# Patient Record
Sex: MALE | Race: WHITE | NOT HISPANIC OR LATINO | URBAN - METROPOLITAN AREA
[De-identification: names, ages, dates, MRNs, and addresses within clinical notes are randomized per-mention and may not be internally consistent; named-entity substitution may affect disease eponyms.]

---

## 2017-01-01 ENCOUNTER — HOME CARE/HOSPICE - HEALTHEAST (OUTPATIENT)
Dept: HOME HEALTH SERVICES | Facility: HOME HEALTH | Age: 82
End: 2017-01-01

## 2017-01-01 ENCOUNTER — COMMUNICATION - HEALTHEAST (OUTPATIENT)
Dept: FAMILY MEDICINE | Facility: CLINIC | Age: 82
End: 2017-01-01

## 2017-01-01 ENCOUNTER — COMMUNICATION - HEALTHEAST (OUTPATIENT)
Dept: SCHEDULING | Facility: CLINIC | Age: 82
End: 2017-01-01

## 2017-01-01 ENCOUNTER — RECORDS - HEALTHEAST (OUTPATIENT)
Dept: ADMINISTRATIVE | Facility: OTHER | Age: 82
End: 2017-01-01

## 2017-01-01 ENCOUNTER — COMMUNICATION - HEALTHEAST (OUTPATIENT)
Dept: HOME HEALTH SERVICES | Facility: HOME HEALTH | Age: 82
End: 2017-01-01

## 2017-01-01 ENCOUNTER — AMBULATORY - HEALTHEAST (OUTPATIENT)
Dept: FAMILY MEDICINE | Facility: CLINIC | Age: 82
End: 2017-01-01

## 2017-01-01 DIAGNOSIS — I26.99 PULMONARY EMBOLISM (H): ICD-10-CM

## 2017-01-01 DIAGNOSIS — S72.009A FEMORAL NECK FRACTURE (H): ICD-10-CM

## 2017-01-11 ENCOUNTER — COMMUNICATION - HEALTHEAST (OUTPATIENT)
Dept: FAMILY MEDICINE | Facility: CLINIC | Age: 82
End: 2017-01-11

## 2017-01-11 DIAGNOSIS — G89.4 CHRONIC PAIN SYNDROME: ICD-10-CM

## 2017-01-12 ENCOUNTER — COMMUNICATION - HEALTHEAST (OUTPATIENT)
Dept: FAMILY MEDICINE | Facility: CLINIC | Age: 82
End: 2017-01-12

## 2017-01-17 ENCOUNTER — OFFICE VISIT - HEALTHEAST (OUTPATIENT)
Dept: FAMILY MEDICINE | Facility: CLINIC | Age: 82
End: 2017-01-17

## 2017-01-17 DIAGNOSIS — G47.00 INSOMNIA, UNSPECIFIED: ICD-10-CM

## 2017-01-17 DIAGNOSIS — M16.11 OSTEOARTHRITIS OF ONE HIP, RIGHT: ICD-10-CM

## 2017-01-17 DIAGNOSIS — G89.29 OTHER CHRONIC PAIN: ICD-10-CM

## 2017-01-18 ENCOUNTER — COMMUNICATION - HEALTHEAST (OUTPATIENT)
Dept: FAMILY MEDICINE | Facility: CLINIC | Age: 82
End: 2017-01-18

## 2017-02-06 ENCOUNTER — COMMUNICATION - HEALTHEAST (OUTPATIENT)
Dept: FAMILY MEDICINE | Facility: CLINIC | Age: 82
End: 2017-02-06

## 2017-02-06 DIAGNOSIS — I10 ESSENTIAL HYPERTENSION, MALIGNANT: ICD-10-CM

## 2017-02-15 ENCOUNTER — COMMUNICATION - HEALTHEAST (OUTPATIENT)
Dept: FAMILY MEDICINE | Facility: CLINIC | Age: 82
End: 2017-02-15

## 2017-02-15 DIAGNOSIS — G89.29 OTHER CHRONIC PAIN: ICD-10-CM

## 2017-03-03 ENCOUNTER — COMMUNICATION - HEALTHEAST (OUTPATIENT)
Dept: FAMILY MEDICINE | Facility: CLINIC | Age: 82
End: 2017-03-03

## 2017-03-06 ENCOUNTER — OFFICE VISIT - HEALTHEAST (OUTPATIENT)
Dept: FAMILY MEDICINE | Facility: CLINIC | Age: 82
End: 2017-03-06

## 2017-03-06 DIAGNOSIS — M54.42 CHRONIC BILATERAL LOW BACK PAIN WITH LEFT-SIDED SCIATICA: ICD-10-CM

## 2017-03-06 DIAGNOSIS — L82.1 SEBORRHEIC KERATOSES: ICD-10-CM

## 2017-03-06 DIAGNOSIS — G89.29 CHRONIC BILATERAL LOW BACK PAIN WITH LEFT-SIDED SCIATICA: ICD-10-CM

## 2017-03-06 DIAGNOSIS — R32 URINARY INCONTINENCE, UNSPECIFIED TYPE: ICD-10-CM

## 2017-03-13 ENCOUNTER — COMMUNICATION - HEALTHEAST (OUTPATIENT)
Dept: FAMILY MEDICINE | Facility: CLINIC | Age: 82
End: 2017-03-13

## 2017-03-13 DIAGNOSIS — G89.29 OTHER CHRONIC PAIN: ICD-10-CM

## 2017-04-10 ENCOUNTER — RECORDS - HEALTHEAST (OUTPATIENT)
Dept: ADMINISTRATIVE | Facility: OTHER | Age: 82
End: 2017-04-10

## 2017-04-10 ENCOUNTER — COMMUNICATION - HEALTHEAST (OUTPATIENT)
Dept: FAMILY MEDICINE | Facility: CLINIC | Age: 82
End: 2017-04-10

## 2017-04-10 DIAGNOSIS — G89.29 OTHER CHRONIC PAIN: ICD-10-CM

## 2017-04-19 ENCOUNTER — OFFICE VISIT - HEALTHEAST (OUTPATIENT)
Dept: FAMILY MEDICINE | Facility: CLINIC | Age: 82
End: 2017-04-19

## 2017-04-19 DIAGNOSIS — S86.912D KNEE STRAIN, LEFT, SUBSEQUENT ENCOUNTER: ICD-10-CM

## 2017-04-19 DIAGNOSIS — I73.9 PERIPHERAL VASCULAR DISEASE, UNSPECIFIED (H): ICD-10-CM

## 2017-04-19 DIAGNOSIS — I10 ESSENTIAL HYPERTENSION WITH GOAL BLOOD PRESSURE LESS THAN 140/90: ICD-10-CM

## 2017-04-19 DIAGNOSIS — F17.200 TOBACCO USE DISORDER: ICD-10-CM

## 2017-04-25 ENCOUNTER — OFFICE VISIT - HEALTHEAST (OUTPATIENT)
Dept: INTERNAL MEDICINE | Facility: CLINIC | Age: 82
End: 2017-04-25

## 2017-04-25 ENCOUNTER — RECORDS - HEALTHEAST (OUTPATIENT)
Dept: GENERAL RADIOLOGY | Age: 82
End: 2017-04-25

## 2017-04-25 DIAGNOSIS — M79.605 PAIN IN LEFT LEG: ICD-10-CM

## 2017-04-25 DIAGNOSIS — M79.605 LEFT LEG PAIN: ICD-10-CM

## 2017-04-26 ENCOUNTER — COMMUNICATION - HEALTHEAST (OUTPATIENT)
Dept: FAMILY MEDICINE | Facility: CLINIC | Age: 82
End: 2017-04-26

## 2017-04-26 ENCOUNTER — COMMUNICATION - HEALTHEAST (OUTPATIENT)
Dept: INTERNAL MEDICINE | Facility: CLINIC | Age: 82
End: 2017-04-26

## 2017-04-26 DIAGNOSIS — R93.6 ABNORMAL X-RAY OF LOWER EXTREMITY: ICD-10-CM

## 2017-04-27 ENCOUNTER — RECORDS - HEALTHEAST (OUTPATIENT)
Dept: GENERAL RADIOLOGY | Age: 82
End: 2017-04-27

## 2017-04-27 DIAGNOSIS — R93.6 ABNORMAL FINDINGS ON DIAGNOSTIC IMAGING OF LIMBS: ICD-10-CM

## 2017-05-01 ENCOUNTER — COMMUNICATION - HEALTHEAST (OUTPATIENT)
Dept: INTERNAL MEDICINE | Facility: CLINIC | Age: 82
End: 2017-05-01

## 2017-05-02 ENCOUNTER — OFFICE VISIT - HEALTHEAST (OUTPATIENT)
Dept: INTERNAL MEDICINE | Facility: CLINIC | Age: 82
End: 2017-05-02

## 2017-05-02 DIAGNOSIS — M89.8X9 RADIOLUCENT LESION OF BONE: ICD-10-CM

## 2017-05-02 DIAGNOSIS — M79.605 LEFT LEG PAIN: ICD-10-CM

## 2017-05-03 ENCOUNTER — HOSPITAL ENCOUNTER (OUTPATIENT)
Dept: CT IMAGING | Facility: HOSPITAL | Age: 82
Discharge: HOME | End: 2017-05-03

## 2017-05-03 DIAGNOSIS — M89.8X9 RADIOLUCENT LESION OF BONE: ICD-10-CM

## 2017-05-04 ENCOUNTER — COMMUNICATION - HEALTHEAST (OUTPATIENT)
Dept: INTERNAL MEDICINE | Facility: CLINIC | Age: 82
End: 2017-05-04

## 2017-05-04 ENCOUNTER — COMMUNICATION - HEALTHEAST (OUTPATIENT)
Dept: PULMONOLOGY | Facility: OTHER | Age: 82
End: 2017-05-04

## 2017-05-04 DIAGNOSIS — I70.90 ATHEROSCLEROSIS: ICD-10-CM

## 2017-05-04 DIAGNOSIS — R91.8 LUNG MASS: ICD-10-CM

## 2017-05-05 LAB
ALBUMIN PERCENT: 56.3 % (ref 51–67)
ALBUMIN SERPL ELPH-MCNC: 3.4 G/DL (ref 3.2–4.7)
ALPHA 1 PERCENT: 4.8 % (ref 2–4)
ALPHA 2 PERCENT: 12.6 % (ref 5–13)
ALPHA1 GLOB SERPL ELPH-MCNC: 0.3 G/DL (ref 0.1–0.3)
ALPHA2 GLOB SERPL ELPH-MCNC: 0.8 G/DL (ref 0.4–0.9)
B-GLOBULIN SERPL ELPH-MCNC: 0.8 G/DL (ref 0.7–1.2)
BETA PERCENT: 12.4 % (ref 10–17)
GAMMA GLOB SERPL ELPH-MCNC: 0.8 G/DL (ref 0.6–1.4)
GAMMA GLOBULIN PERCENT: 13.9 % (ref 9–20)
PATH ICD:: ABNORMAL
PROT PATTERN SERPL ELPH-IMP: ABNORMAL
PROT SERPL-MCNC: 6.1 G/DL (ref 6–8)
REVIEWING PATHOLOGIST: ABNORMAL

## 2017-05-09 ENCOUNTER — HOSPITAL ENCOUNTER (OUTPATIENT)
Dept: PET IMAGING | Facility: HOSPITAL | Age: 82
Discharge: HOME | End: 2017-05-09

## 2017-05-09 DIAGNOSIS — R91.8 LUNG MASS: ICD-10-CM

## 2017-05-09 ASSESSMENT — MIFFLIN-ST. JEOR: SCORE: 1154.39

## 2017-05-11 ENCOUNTER — COMMUNICATION - HEALTHEAST (OUTPATIENT)
Dept: INTERNAL MEDICINE | Facility: CLINIC | Age: 82
End: 2017-05-11

## 2017-05-11 ENCOUNTER — COMMUNICATION - HEALTHEAST (OUTPATIENT)
Dept: FAMILY MEDICINE | Facility: CLINIC | Age: 82
End: 2017-05-11

## 2017-05-11 DIAGNOSIS — G89.29 OTHER CHRONIC PAIN: ICD-10-CM

## 2017-05-12 ENCOUNTER — OFFICE VISIT - HEALTHEAST (OUTPATIENT)
Dept: PULMONOLOGY | Facility: OTHER | Age: 82
End: 2017-05-12

## 2017-05-12 DIAGNOSIS — R05.3 CHRONIC COUGH: ICD-10-CM

## 2017-05-12 DIAGNOSIS — J85.2 ABSCESS OF UPPER LOBE OF LEFT LUNG WITHOUT PNEUMONIA (H): ICD-10-CM

## 2017-05-12 DIAGNOSIS — F17.200 TOBACCO USE DISORDER: ICD-10-CM

## 2017-05-12 ASSESSMENT — MIFFLIN-ST. JEOR: SCORE: 1140.33

## 2017-05-16 ENCOUNTER — OFFICE VISIT - HEALTHEAST (OUTPATIENT)
Dept: INTERNAL MEDICINE | Facility: CLINIC | Age: 82
End: 2017-05-16

## 2017-05-16 DIAGNOSIS — M53.3 COCCYX PAIN: ICD-10-CM

## 2017-05-16 ASSESSMENT — MIFFLIN-ST. JEOR: SCORE: 1158.92

## 2017-05-17 ENCOUNTER — OFFICE VISIT - HEALTHEAST (OUTPATIENT)
Dept: VASCULAR SURGERY | Facility: CLINIC | Age: 82
End: 2017-05-17

## 2017-05-17 DIAGNOSIS — I70.90 ATHEROSCLEROSIS: ICD-10-CM

## 2017-05-17 ASSESSMENT — MIFFLIN-ST. JEOR: SCORE: 1158.92

## 2017-05-23 ENCOUNTER — RECORDS - HEALTHEAST (OUTPATIENT)
Dept: ADMINISTRATIVE | Facility: OTHER | Age: 82
End: 2017-05-23

## 2017-06-01 ENCOUNTER — COMMUNICATION - HEALTHEAST (OUTPATIENT)
Dept: FAMILY MEDICINE | Facility: CLINIC | Age: 82
End: 2017-06-01

## 2017-06-06 ENCOUNTER — HOME CARE/HOSPICE - HEALTHEAST (OUTPATIENT)
Dept: HOME HEALTH SERVICES | Facility: HOME HEALTH | Age: 82
End: 2017-06-06

## 2017-06-08 ENCOUNTER — COMMUNICATION - HEALTHEAST (OUTPATIENT)
Dept: FAMILY MEDICINE | Facility: CLINIC | Age: 82
End: 2017-06-08

## 2017-06-23 ENCOUNTER — COMMUNICATION - HEALTHEAST (OUTPATIENT)
Dept: FAMILY MEDICINE | Facility: CLINIC | Age: 82
End: 2017-06-23

## 2017-06-23 DIAGNOSIS — G89.29 OTHER CHRONIC PAIN: ICD-10-CM

## 2017-07-13 ENCOUNTER — HOSPITAL ENCOUNTER (OUTPATIENT)
Dept: CT IMAGING | Facility: HOSPITAL | Age: 82
Discharge: HOME | End: 2017-07-13
Attending: INTERNAL MEDICINE

## 2017-07-13 ENCOUNTER — COMMUNICATION - HEALTHEAST (OUTPATIENT)
Dept: FAMILY MEDICINE | Facility: CLINIC | Age: 82
End: 2017-07-13

## 2017-07-13 DIAGNOSIS — J85.1 ABSCESS OF LEFT LUNG WITH PNEUMONIA, UNSPECIFIED PART OF LUNG (H): ICD-10-CM

## 2017-07-17 ENCOUNTER — AMBULATORY - HEALTHEAST (OUTPATIENT)
Dept: LAB | Facility: CLINIC | Age: 82
End: 2017-07-17

## 2017-07-17 ENCOUNTER — COMMUNICATION - HEALTHEAST (OUTPATIENT)
Dept: FAMILY MEDICINE | Facility: CLINIC | Age: 82
End: 2017-07-17

## 2017-07-17 ENCOUNTER — AMBULATORY - HEALTHEAST (OUTPATIENT)
Dept: FAMILY MEDICINE | Facility: CLINIC | Age: 82
End: 2017-07-17

## 2017-07-17 DIAGNOSIS — I25.10 CARDIOVASCULAR DISEASE: ICD-10-CM

## 2017-07-17 DIAGNOSIS — I26.99 PULMONARY EMBOLISM, BILATERAL (H): ICD-10-CM

## 2017-07-19 ENCOUNTER — AMBULATORY - HEALTHEAST (OUTPATIENT)
Dept: LAB | Facility: CLINIC | Age: 82
End: 2017-07-19

## 2017-07-19 DIAGNOSIS — I26.99 PULMONARY EMBOLISM, BILATERAL (H): ICD-10-CM

## 2017-07-21 ENCOUNTER — OFFICE VISIT - HEALTHEAST (OUTPATIENT)
Dept: FAMILY MEDICINE | Facility: CLINIC | Age: 82
End: 2017-07-21

## 2017-07-21 DIAGNOSIS — I26.99 PULMONARY EMBOLISM, BILATERAL (H): ICD-10-CM

## 2017-07-21 DIAGNOSIS — Z93.1 GASTROSTOMY TUBE IN PLACE (H): ICD-10-CM

## 2017-07-21 DIAGNOSIS — I10 ESSENTIAL HYPERTENSION WITH GOAL BLOOD PRESSURE LESS THAN 140/90: ICD-10-CM

## 2017-07-21 ASSESSMENT — MIFFLIN-ST. JEOR: SCORE: 1107.89

## 2017-07-24 ENCOUNTER — COMMUNICATION - HEALTHEAST (OUTPATIENT)
Dept: LAB | Facility: CLINIC | Age: 82
End: 2017-07-24

## 2017-07-24 ENCOUNTER — AMBULATORY - HEALTHEAST (OUTPATIENT)
Dept: LAB | Facility: CLINIC | Age: 82
End: 2017-07-24

## 2017-07-24 ENCOUNTER — COMMUNICATION - HEALTHEAST (OUTPATIENT)
Dept: FAMILY MEDICINE | Facility: CLINIC | Age: 82
End: 2017-07-24

## 2017-07-24 DIAGNOSIS — I10 ESSENTIAL HYPERTENSION: ICD-10-CM

## 2017-07-24 DIAGNOSIS — G89.29 OTHER CHRONIC PAIN: ICD-10-CM

## 2017-07-24 DIAGNOSIS — I26.99 PULMONARY EMBOLISM, BILATERAL (H): ICD-10-CM

## 2017-07-26 ENCOUNTER — AMBULATORY - HEALTHEAST (OUTPATIENT)
Dept: LAB | Facility: CLINIC | Age: 82
End: 2017-07-26

## 2017-07-26 DIAGNOSIS — I26.99 PULMONARY EMBOLISM, BILATERAL (H): ICD-10-CM

## 2017-07-27 ENCOUNTER — AMBULATORY - HEALTHEAST (OUTPATIENT)
Dept: FAMILY MEDICINE | Facility: CLINIC | Age: 82
End: 2017-07-27

## 2017-07-27 DIAGNOSIS — Z93.1 GASTROSTOMY TUBE IN PLACE (H): ICD-10-CM

## 2017-08-01 ENCOUNTER — AMBULATORY - HEALTHEAST (OUTPATIENT)
Dept: LAB | Facility: CLINIC | Age: 82
End: 2017-08-01

## 2017-08-01 DIAGNOSIS — I26.99 PULMONARY EMBOLISM, BILATERAL (H): ICD-10-CM

## 2017-08-02 ENCOUNTER — HOSPITAL ENCOUNTER (OUTPATIENT)
Dept: INTERVENTIONAL RADIOLOGY/VASCULAR | Facility: HOSPITAL | Age: 82
Discharge: HOME | End: 2017-08-02
Attending: FAMILY MEDICINE

## 2017-08-02 ENCOUNTER — AMBULATORY - HEALTHEAST (OUTPATIENT)
Dept: FAMILY MEDICINE | Facility: CLINIC | Age: 82
End: 2017-08-02

## 2017-08-02 DIAGNOSIS — Z93.1 GASTROSTOMY TUBE IN PLACE (H): ICD-10-CM

## 2017-08-09 ENCOUNTER — COMMUNICATION - HEALTHEAST (OUTPATIENT)
Dept: FAMILY MEDICINE | Facility: CLINIC | Age: 82
End: 2017-08-09

## 2017-08-09 DIAGNOSIS — G89.29 OTHER CHRONIC PAIN: ICD-10-CM

## 2017-08-10 ENCOUNTER — AMBULATORY - HEALTHEAST (OUTPATIENT)
Dept: LAB | Facility: CLINIC | Age: 82
End: 2017-08-10

## 2017-08-10 DIAGNOSIS — I26.99 PULMONARY EMBOLISM, BILATERAL (H): ICD-10-CM

## 2017-08-11 ENCOUNTER — AMBULATORY - HEALTHEAST (OUTPATIENT)
Dept: FAMILY MEDICINE | Facility: CLINIC | Age: 82
End: 2017-08-11

## 2017-08-23 ENCOUNTER — AMBULATORY - HEALTHEAST (OUTPATIENT)
Dept: FAMILY MEDICINE | Facility: CLINIC | Age: 82
End: 2017-08-23

## 2017-08-23 ENCOUNTER — COMMUNICATION - HEALTHEAST (OUTPATIENT)
Dept: FAMILY MEDICINE | Facility: CLINIC | Age: 82
End: 2017-08-23

## 2017-08-23 ENCOUNTER — AMBULATORY - HEALTHEAST (OUTPATIENT)
Dept: LAB | Facility: CLINIC | Age: 82
End: 2017-08-23

## 2017-08-23 DIAGNOSIS — I26.99 PULMONARY EMBOLISM, BILATERAL (H): ICD-10-CM

## 2017-08-23 DIAGNOSIS — G89.29 OTHER CHRONIC PAIN: ICD-10-CM

## 2017-08-24 ENCOUNTER — COMMUNICATION - HEALTHEAST (OUTPATIENT)
Dept: FAMILY MEDICINE | Facility: CLINIC | Age: 82
End: 2017-08-24

## 2017-08-31 ENCOUNTER — AMBULATORY - HEALTHEAST (OUTPATIENT)
Dept: LAB | Facility: CLINIC | Age: 82
End: 2017-08-31

## 2017-08-31 DIAGNOSIS — I26.99 PULMONARY EMBOLISM, BILATERAL (H): ICD-10-CM

## 2017-09-01 ENCOUNTER — AMBULATORY - HEALTHEAST (OUTPATIENT)
Dept: FAMILY MEDICINE | Facility: CLINIC | Age: 82
End: 2017-09-01

## 2017-09-06 ENCOUNTER — COMMUNICATION - HEALTHEAST (OUTPATIENT)
Dept: FAMILY MEDICINE | Facility: CLINIC | Age: 82
End: 2017-09-06

## 2017-09-11 ENCOUNTER — COMMUNICATION - HEALTHEAST (OUTPATIENT)
Dept: FAMILY MEDICINE | Facility: CLINIC | Age: 82
End: 2017-09-11

## 2017-09-11 DIAGNOSIS — G89.29 OTHER CHRONIC PAIN: ICD-10-CM

## 2017-09-13 ENCOUNTER — COMMUNICATION - HEALTHEAST (OUTPATIENT)
Dept: FAMILY MEDICINE | Facility: CLINIC | Age: 82
End: 2017-09-13

## 2017-09-13 DIAGNOSIS — G89.29 OTHER CHRONIC PAIN: ICD-10-CM

## 2017-09-13 DIAGNOSIS — I10 ESSENTIAL HYPERTENSION WITH GOAL BLOOD PRESSURE LESS THAN 140/90: ICD-10-CM

## 2017-09-27 ENCOUNTER — COMMUNICATION - HEALTHEAST (OUTPATIENT)
Dept: FAMILY MEDICINE | Facility: CLINIC | Age: 82
End: 2017-09-27

## 2017-09-28 ENCOUNTER — COMMUNICATION - HEALTHEAST (OUTPATIENT)
Dept: FAMILY MEDICINE | Facility: CLINIC | Age: 82
End: 2017-09-28

## 2017-09-28 DIAGNOSIS — I25.10 CARDIOVASCULAR DISEASE: ICD-10-CM

## 2017-09-28 DIAGNOSIS — I10 UNSPECIFIED ESSENTIAL HYPERTENSION: ICD-10-CM

## 2017-09-28 DIAGNOSIS — I10 ESSENTIAL HYPERTENSION, MALIGNANT: ICD-10-CM

## 2017-10-02 ENCOUNTER — COMMUNICATION - HEALTHEAST (OUTPATIENT)
Dept: FAMILY MEDICINE | Facility: CLINIC | Age: 82
End: 2017-10-02

## 2017-10-02 DIAGNOSIS — G89.29 OTHER CHRONIC PAIN: ICD-10-CM

## 2017-10-04 ENCOUNTER — COMMUNICATION - HEALTHEAST (OUTPATIENT)
Dept: FAMILY MEDICINE | Facility: CLINIC | Age: 82
End: 2017-10-04

## 2017-10-18 ENCOUNTER — COMMUNICATION - HEALTHEAST (OUTPATIENT)
Dept: FAMILY MEDICINE | Facility: CLINIC | Age: 82
End: 2017-10-18

## 2017-10-19 ASSESSMENT — MIFFLIN-ST. JEOR: SCORE: 1070.93

## 2017-10-20 ENCOUNTER — ANESTHESIA - HEALTHEAST (OUTPATIENT)
Dept: SURGERY | Facility: HOSPITAL | Age: 82
End: 2017-10-20

## 2017-10-20 ENCOUNTER — COMMUNICATION - HEALTHEAST (OUTPATIENT)
Dept: FAMILY MEDICINE | Facility: CLINIC | Age: 82
End: 2017-10-20

## 2017-10-20 ENCOUNTER — SURGERY - HEALTHEAST (OUTPATIENT)
Dept: SURGERY | Facility: HOSPITAL | Age: 82
End: 2017-10-20

## 2017-10-20 ASSESSMENT — MIFFLIN-ST. JEOR: SCORE: 1071.83

## 2017-10-25 ENCOUNTER — HOME CARE/HOSPICE - HEALTHEAST (OUTPATIENT)
Dept: HOME HEALTH SERVICES | Facility: HOME HEALTH | Age: 82
End: 2017-10-25

## 2017-10-26 ENCOUNTER — COMMUNICATION - HEALTHEAST (OUTPATIENT)
Dept: FAMILY MEDICINE | Facility: CLINIC | Age: 82
End: 2017-10-26

## 2017-10-28 ENCOUNTER — COMMUNICATION - HEALTHEAST (OUTPATIENT)
Dept: HOME HEALTH SERVICES | Facility: HOME HEALTH | Age: 82
End: 2017-10-28

## 2017-10-31 ENCOUNTER — HOME CARE/HOSPICE - HEALTHEAST (OUTPATIENT)
Dept: HOME HEALTH SERVICES | Facility: HOME HEALTH | Age: 82
End: 2017-10-31

## 2017-10-31 ENCOUNTER — COMMUNICATION - HEALTHEAST (OUTPATIENT)
Dept: HOME HEALTH SERVICES | Facility: HOME HEALTH | Age: 82
End: 2017-10-31

## 2017-11-01 ENCOUNTER — HOME CARE/HOSPICE - HEALTHEAST (OUTPATIENT)
Dept: HOME HEALTH SERVICES | Facility: HOME HEALTH | Age: 82
End: 2017-11-01

## 2017-11-02 ENCOUNTER — COMMUNICATION - HEALTHEAST (OUTPATIENT)
Dept: FAMILY MEDICINE | Facility: CLINIC | Age: 82
End: 2017-11-02

## 2017-11-02 ENCOUNTER — COMMUNICATION - HEALTHEAST (OUTPATIENT)
Dept: HOME HEALTH SERVICES | Facility: HOME HEALTH | Age: 82
End: 2017-11-02

## 2017-11-02 ENCOUNTER — HOME CARE/HOSPICE - HEALTHEAST (OUTPATIENT)
Dept: HOME HEALTH SERVICES | Facility: HOME HEALTH | Age: 82
End: 2017-11-02

## 2017-11-03 ENCOUNTER — HOME CARE/HOSPICE - HEALTHEAST (OUTPATIENT)
Dept: HOME HEALTH SERVICES | Facility: HOME HEALTH | Age: 82
End: 2017-11-03

## 2017-11-03 ENCOUNTER — COMMUNICATION - HEALTHEAST (OUTPATIENT)
Dept: HOME HEALTH SERVICES | Facility: HOME HEALTH | Age: 82
End: 2017-11-03

## 2017-11-03 ENCOUNTER — AMBULATORY - HEALTHEAST (OUTPATIENT)
Dept: FAMILY MEDICINE | Facility: CLINIC | Age: 82
End: 2017-11-03

## 2017-11-03 ENCOUNTER — COMMUNICATION - HEALTHEAST (OUTPATIENT)
Dept: SCHEDULING | Facility: CLINIC | Age: 82
End: 2017-11-03

## 2017-11-03 RX ORDER — FUROSEMIDE 20 MG
20 TABLET ORAL DAILY
Qty: 30 TABLET | Refills: 11 | Status: SHIPPED | OUTPATIENT
Start: 2017-11-03

## 2017-11-06 ENCOUNTER — HOME CARE/HOSPICE - HEALTHEAST (OUTPATIENT)
Dept: HOME HEALTH SERVICES | Facility: HOME HEALTH | Age: 82
End: 2017-11-06

## 2017-11-06 ENCOUNTER — COMMUNICATION - HEALTHEAST (OUTPATIENT)
Dept: FAMILY MEDICINE | Facility: CLINIC | Age: 82
End: 2017-11-06

## 2017-11-06 DIAGNOSIS — I26.99 PULMONARY EMBOLISM (H): ICD-10-CM

## 2017-11-07 ENCOUNTER — HOME CARE/HOSPICE - HEALTHEAST (OUTPATIENT)
Dept: HOME HEALTH SERVICES | Facility: HOME HEALTH | Age: 82
End: 2017-11-07

## 2017-11-08 ENCOUNTER — HOME CARE/HOSPICE - HEALTHEAST (OUTPATIENT)
Dept: HOME HEALTH SERVICES | Facility: HOME HEALTH | Age: 82
End: 2017-11-08

## 2017-11-09 ENCOUNTER — HOME CARE/HOSPICE - HEALTHEAST (OUTPATIENT)
Dept: HOME HEALTH SERVICES | Facility: HOME HEALTH | Age: 82
End: 2017-11-09

## 2017-11-10 ENCOUNTER — HOME CARE/HOSPICE - HEALTHEAST (OUTPATIENT)
Dept: HOME HEALTH SERVICES | Facility: HOME HEALTH | Age: 82
End: 2017-11-10

## 2017-11-11 ENCOUNTER — HOME CARE/HOSPICE - HEALTHEAST (OUTPATIENT)
Dept: HOME HEALTH SERVICES | Facility: HOME HEALTH | Age: 82
End: 2017-11-11

## 2017-11-13 ENCOUNTER — HOME CARE/HOSPICE - HEALTHEAST (OUTPATIENT)
Dept: HOME HEALTH SERVICES | Facility: HOME HEALTH | Age: 82
End: 2017-11-13

## 2017-11-13 ENCOUNTER — COMMUNICATION - HEALTHEAST (OUTPATIENT)
Dept: FAMILY MEDICINE | Facility: CLINIC | Age: 82
End: 2017-11-13

## 2017-11-13 DIAGNOSIS — I26.99 PULMONARY EMBOLISM (H): ICD-10-CM

## 2017-11-16 ENCOUNTER — HOME CARE/HOSPICE - HEALTHEAST (OUTPATIENT)
Dept: HOME HEALTH SERVICES | Facility: HOME HEALTH | Age: 82
End: 2017-11-16

## 2017-11-16 ENCOUNTER — COMMUNICATION - HEALTHEAST (OUTPATIENT)
Dept: FAMILY MEDICINE | Facility: CLINIC | Age: 82
End: 2017-11-16

## 2017-11-16 DIAGNOSIS — I26.99 PULMONARY EMBOLISM (H): ICD-10-CM

## 2017-11-20 ENCOUNTER — HOME CARE/HOSPICE - HEALTHEAST (OUTPATIENT)
Dept: HOME HEALTH SERVICES | Facility: HOME HEALTH | Age: 82
End: 2017-11-20

## 2017-11-20 ENCOUNTER — COMMUNICATION - HEALTHEAST (OUTPATIENT)
Dept: FAMILY MEDICINE | Facility: CLINIC | Age: 82
End: 2017-11-20

## 2017-11-20 DIAGNOSIS — I25.10 CARDIOVASCULAR DISEASE: ICD-10-CM

## 2017-11-20 DIAGNOSIS — I26.99 PULMONARY EMBOLISM (H): ICD-10-CM

## 2018-01-01 ENCOUNTER — COMMUNICATION - HEALTHEAST (OUTPATIENT)
Dept: FAMILY MEDICINE | Facility: CLINIC | Age: 83
End: 2018-01-01

## 2018-01-01 ENCOUNTER — COMMUNICATION - HEALTHEAST (OUTPATIENT)
Dept: SCHEDULING | Facility: CLINIC | Age: 83
End: 2018-01-01

## 2018-01-01 ENCOUNTER — AMBULATORY - HEALTHEAST (OUTPATIENT)
Dept: FAMILY MEDICINE | Facility: CLINIC | Age: 83
End: 2018-01-01

## 2018-01-01 DIAGNOSIS — I10 ESSENTIAL HYPERTENSION, MALIGNANT: ICD-10-CM

## 2018-01-01 DIAGNOSIS — R52 PAIN: ICD-10-CM

## 2018-01-01 DIAGNOSIS — I10 ESSENTIAL HYPERTENSION: ICD-10-CM

## 2018-01-01 DIAGNOSIS — I25.10 CARDIOVASCULAR DISEASE: ICD-10-CM

## 2018-01-01 RX ORDER — LOVASTATIN 20 MG
20 TABLET ORAL DAILY
Qty: 90 TABLET | Refills: 0 | Status: SHIPPED | OUTPATIENT
Start: 2018-01-01

## 2018-01-01 RX ORDER — TRAZODONE HYDROCHLORIDE 50 MG/1
50 TABLET, FILM COATED ORAL
Qty: 30 TABLET | Refills: 11 | Status: SHIPPED | OUTPATIENT
Start: 2018-01-01

## 2018-01-01 RX ORDER — OXYCODONE HYDROCHLORIDE 5 MG/1
5-10 TABLET ORAL 3 TIMES DAILY PRN
Qty: 100 TABLET | Refills: 0 | Status: SHIPPED | OUTPATIENT
Start: 2018-01-01

## 2018-01-01 RX ORDER — METOPROLOL TARTRATE 25 MG/1
12.5 TABLET, FILM COATED ORAL 2 TIMES DAILY
Qty: 30 TABLET | Refills: 11 | Status: SHIPPED | OUTPATIENT
Start: 2018-01-01

## 2018-01-01 RX ORDER — LISINOPRIL 20 MG/1
20 TABLET ORAL DAILY
Qty: 30 TABLET | Refills: 11 | Status: SHIPPED | OUTPATIENT
Start: 2018-01-01

## 2018-01-01 RX ORDER — AMLODIPINE BESYLATE 5 MG/1
5 TABLET ORAL DAILY
Qty: 90 TABLET | Refills: 0 | Status: SHIPPED | OUTPATIENT
Start: 2018-01-01

## 2018-11-23 ENCOUNTER — COMMUNICATION - HEALTHEAST (OUTPATIENT)
Dept: FAMILY MEDICINE | Facility: CLINIC | Age: 83
End: 2018-11-23

## 2021-05-24 ENCOUNTER — RECORDS - HEALTHEAST (OUTPATIENT)
Dept: ADMINISTRATIVE | Facility: CLINIC | Age: 86
End: 2021-05-24

## 2021-05-25 ENCOUNTER — RECORDS - HEALTHEAST (OUTPATIENT)
Dept: ADMINISTRATIVE | Facility: CLINIC | Age: 86
End: 2021-05-25

## 2021-05-26 ENCOUNTER — RECORDS - HEALTHEAST (OUTPATIENT)
Dept: ADMINISTRATIVE | Facility: CLINIC | Age: 86
End: 2021-05-26

## 2021-05-27 ENCOUNTER — RECORDS - HEALTHEAST (OUTPATIENT)
Dept: ADMINISTRATIVE | Facility: CLINIC | Age: 86
End: 2021-05-27

## 2021-05-28 ENCOUNTER — RECORDS - HEALTHEAST (OUTPATIENT)
Dept: ADMINISTRATIVE | Facility: CLINIC | Age: 86
End: 2021-05-28

## 2021-05-29 ENCOUNTER — RECORDS - HEALTHEAST (OUTPATIENT)
Dept: ADMINISTRATIVE | Facility: CLINIC | Age: 86
End: 2021-05-29

## 2021-05-30 ENCOUNTER — RECORDS - HEALTHEAST (OUTPATIENT)
Dept: ADMINISTRATIVE | Facility: CLINIC | Age: 86
End: 2021-05-30

## 2021-05-30 VITALS — WEIGHT: 117 LBS

## 2021-05-30 VITALS — WEIGHT: 123 LBS

## 2021-05-30 VITALS — WEIGHT: 118.9 LBS | HEIGHT: 64 IN | BODY MASS INDEX: 20.3 KG/M2

## 2021-05-30 VITALS — WEIGHT: 123 LBS | HEIGHT: 64 IN | BODY MASS INDEX: 21 KG/M2

## 2021-05-30 VITALS — WEIGHT: 121 LBS

## 2021-05-30 VITALS — BODY MASS INDEX: 21 KG/M2 | HEIGHT: 64 IN | WEIGHT: 123 LBS

## 2021-05-30 VITALS — WEIGHT: 125.75 LBS

## 2021-05-30 VITALS — BODY MASS INDEX: 20.83 KG/M2 | HEIGHT: 64 IN | WEIGHT: 122 LBS

## 2021-05-31 ENCOUNTER — RECORDS - HEALTHEAST (OUTPATIENT)
Dept: ADMINISTRATIVE | Facility: CLINIC | Age: 86
End: 2021-05-31

## 2021-05-31 VITALS — HEIGHT: 64 IN | BODY MASS INDEX: 17.72 KG/M2 | WEIGHT: 103.8 LBS

## 2021-05-31 VITALS — HEIGHT: 64 IN | BODY MASS INDEX: 19.08 KG/M2 | WEIGHT: 111.75 LBS

## 2021-06-01 ENCOUNTER — RECORDS - HEALTHEAST (OUTPATIENT)
Dept: ADMINISTRATIVE | Facility: CLINIC | Age: 86
End: 2021-06-01

## 2021-06-02 ENCOUNTER — RECORDS - HEALTHEAST (OUTPATIENT)
Dept: ADMINISTRATIVE | Facility: CLINIC | Age: 86
End: 2021-06-02

## 2021-06-08 NOTE — PROGRESS NOTES
Assessment: /    Plan:    1. Chronic Pain  Oxycodone HCl 10 mg Tab   2. Insomnia  traZODone (DESYREL) 50 MG tablet   3. Osteoarthritis of one hip, right         Change from methadone to oxycodone 10 mg 3 times daily, which is equivalent to what he had been taking previously.  Begin trazodone for sleep.  Recheck in one month, or sooner if any problems.  This was a 30 minute visit with >50% of the time spent in counseling and coordination of care regarding: Chronic pain, insomnia, right hip osteoarthritis.      Subjective:    HPI:  Danilo Javier is an 81-year-old male presenting for evaluation of chronic pain.  He wishes to change to the [facility] because he lives closer to this office.  He states that methadone has not been helping enough to relieve his pain.  He states that previously he took 6 Percocet 5-325 per day.    He states that melatonin has not been helping to sleep.    I reviewed 11/29/16 office visit with Dr. Tian regarding right hip osteoarthritis.  I also reviewed the report of the pelvis and hip x-rays indicating severe right hip osteoarthritis.  Patient states that he does not wish to have surgery during the winter, which would make it very difficult to get around.    Social Hx:  He is accompanied today by his wife, Liana.    Review of Systems:  He notes mild swelling of the feet during the past 1 week.  He has not had a cough, or chest pain.  He notes intermittent urinary incontinence which has been long-standing.      Current Outpatient Prescriptions   Medication Sig Dispense Refill     amLODIPine (NORVASC) 5 MG tablet Take 1 tablet (5 mg total) by mouth daily. 90 tablet 0     aspirin 325 MG tablet Take 325 mg by mouth daily.       CALCIUM CARBONATE (CALCIUM 500 ORAL) Take 1 tablet by mouth 2 (two) times a day.       furosemide (LASIX) 20 MG tablet TAKE 2 TABLET BY MOUTH DAILY 180 tablet 1     lisinopril (PRINIVIL,ZESTRIL) 10 MG tablet TAKE 1 TABLET BY MOUTH EVERY DAY 90 tablet 0      lovastatin (MEVACOR) 20 MG tablet TAKE 1 TABLET BY MOUTH DAILY 90 tablet 3     multivitamin (MULTIVITAMIN) per tablet Take 1 tablet by mouth daily.       Oxycodone HCl 10 mg Tab Take 10 mg by mouth 3 (three) times a day as needed. 90 each 0     traZODone (DESYREL) 50 MG tablet Take 1 tablet (50 mg total) by mouth bedtime. 30 tablet 11     No current facility-administered medications for this visit.          Objective:    Vitals:    01/17/17 1219 01/17/17 1225   BP: 150/70 148/70   Patient Site: Left Arm Left Arm   Patient Position: Sitting Sitting   Cuff Size: Adult Regular Adult Regular   Pulse: 93    Resp: 20    Temp: 98.2  F (36.8  C)    TempSrc: Oral    SpO2: 98%    Weight: 117 lb (53.1 kg)        Gen:  NAD, VSS.  He is seated in a wheelchair.  Lungs:  normal  Heart:  normal  Feet with 1+ edema, no ulcerations.        ADDITIONAL HISTORY SUMMARIZED (2): Reviewed 11/29/16 note by Dr. Tian.  DECISION TO OBTAIN EXTRA INFORMATION (1): None.   RADIOLOGY TESTS (1): 11/29/16 x-rays of the pelvis and hips.  LABS (1): None.  MEDICINE TESTS (1): None.  INDEPENDENT REVIEW (2 each): None.     Total Data Points: 3

## 2021-06-10 NOTE — PROGRESS NOTES
This winter popped left knee when operating .  Finished the snow blowing.  Taking oxycodone for back all the time.  Helps the knee.  Had used a walker for awhile, then worse, switched to wc.    In wc since prior to christmas.  Had seen other doctors and they won't touch him.  No testing.      Hypertension denies chest pain    Nicotine dependence.  Denies hemoptysis.  Over a ppd.    Says never told he had peripheral vascular disease  Wishes to switch primary provider and wishing ongoing pain control for back.    ROS: as noted above    OBJECTIVE:   Vitals:    04/19/17 1337   BP: 132/54   Pulse: 60   Resp: 20      Eyes: non icteric, noninflamed.  asymetric lids  Lungs: no resp distress  Heart: regular  Ankles: cindy edema   Tense firm cool skin.  No pulses felt.  Muscles: nontender  Mental status: euthymic  Neuro: nonfocal  Gait: wheelchair  Left knee and right with lig laxity anterior drawer.  Both flex over ninety degrees.  No effusion warmth or erythema.  Mild tender peripatellar    ASSESSMENT/PLAN:    1. Nicotine Dependence     2. Peripheral Vascular Disease     3. Essential hypertension with goal blood pressure less than 140/90     4. Knee strain, left, subsequent encounter       Knee strain/ offered but declined physical therapy.  Says is better and he'll try to strengthen  Advised on risk factor management of pvd  Educated on my general lack of chronic narcotic use for chronic pain.  Life style modification for lyric cessation.  Chronic issues stable/ same treatment.

## 2021-06-10 NOTE — PROGRESS NOTES
Assessment/Plan:        Diagnoses and all orders for this visit:    Abscess of upper lobe of left lung without pneumonia  -     clindamycin (CLEOCIN HCL) 300 MG capsule; Take 1 capsule (300 mg total) by mouth 3 (three) times a day.  Dispense: 126 capsule; Refill: 0  -     CT Chest Without Contrast; Future; Expected date: 6/30/17    Nicotine Dependence    Chronic cough     81-year-old man with findings on chest CT highly concerning for cancer given his history.  However characteristics of the lesion including uniformly circular shape, thin to medium relatively smooth thickness and absolute central necrosis seem more consistent with lung abscess.  We will treat with antibiotics with close follow-up.  Because of his penicillin allergy we will use clindamycin.  I counseled he and his wife very clearly about the risks for Clostridium difficile diarrhea and reasons for contacting us.    We also discussed decreasing tobacco use.  He is contemplating quitting.    Lung Infection  The mass on your lung looks like an infection, or abscess.  This is treated with antibiotics.  Because we want to be careful and make sure it isn't a tumor that looks like infection we will follow up closely with a CT.    Medication Plan  Clindamycin 1 tab 3 times daily for 6 weeks   -call us if you get significant diarrhea with abdominal pain or fever    Follow up  CT scan and clinic visit in 6 weeks.      Greater than 3 minutes spent discussing smoking cessation.    Subjective:    Patient ID: Danilo Javier is a 81 y.o. male.    HPI Comments: 9-year-old man referred here for lung mass.  He has cough with chronic sputum production.  This is been ongoing for many years.  This may have worsened with his tonsillitis.  Nothing seems to make it better.  He may be coughing up a little bit more sputum over the last few months.  He denies any hemoptysis.  His symptoms localized to his chest.  He had an episode of fever several months ago attributed to  tonsillitis.  This resolved.  He has no chest pain.  He does have leg pain for which she had some evaluation.  There is some concern about decreased mineral density as well as vessel disease.    He has been losing weight.  He has upper dentures and for lower is 1 of his teeth fell out recently.  However no known lung abscesses.    He has been smoking for many years.  He fears it will be very difficult to quit.  He is considering cutting down or quitting.    HPI collected from the patient, his wife and chart review.    Review of Systems  Review of systems is positive for dental problem mouth sores choking cold intolerance joint pain.  The remainder of a 15 system review of systems is negative.        Objective:    Physical Exam   Constitutional: He is oriented to person, place, and time. He appears well-developed and well-nourished. No distress.   HENT:   Head: Normocephalic.   Nose: Nose normal.   Mouth/Throat: Oropharynx is clear and moist. No oropharyngeal exudate.   Eyes: Pupils are equal, round, and reactive to light. Right eye exhibits no discharge. Left eye exhibits no discharge. No scleral icterus.   Neck: Normal range of motion. No JVD present. No tracheal deviation present. No thyromegaly present.   Cardiovascular: Normal rate and regular rhythm.  Exam reveals no gallop and no friction rub.    No murmur heard.  Pulmonary/Chest: Effort normal and breath sounds normal. No stridor. No respiratory distress. He has no wheezes. He has no rales.   Abdominal: Soft. Bowel sounds are normal. He exhibits no distension. There is no tenderness.   Musculoskeletal: He exhibits no edema or tenderness.   Lymphadenopathy:     He has no cervical adenopathy.   Neurological: He is alert and oriented to person, place, and time. No cranial nerve deficit.   Skin: Skin is warm and dry. No rash noted. He is not diaphoretic. No erythema. No pallor.   Psychiatric: He has a normal mood and affect. His behavior is normal. Judgment and  "thought content normal.           Current Outpatient Prescriptions on File Prior to Visit   Medication Sig Dispense Refill     amLODIPine (NORVASC) 5 MG tablet Take 1 tablet (5 mg total) by mouth daily. 90 tablet 0     aspirin 325 MG tablet Take 325 mg by mouth daily.       CALCIUM CARBONATE (CALCIUM 500 ORAL) Take 1 tablet by mouth 2 (two) times a day.       furosemide (LASIX) 20 MG tablet TAKE 2 TABLET BY MOUTH DAILY 180 tablet 1     lisinopril (PRINIVIL,ZESTRIL) 10 MG tablet TAKE 1 TABLET BY MOUTH EVERY DAY 90 tablet 0     lovastatin (MEVACOR) 20 MG tablet TAKE 1 TABLET BY MOUTH DAILY 90 tablet 3     multivitamin (MULTIVITAMIN) per tablet Take 1 tablet by mouth daily.       oxyCODONE (ROXICODONE) 10 mg immediate release tablet Take 1 tablet (10 mg total) by mouth 3 (three) times a day as needed. 90 tablet 0     [DISCONTINUED] traMADol (ULTRAM) 50 mg tablet Take 1 tablet (50 mg total) by mouth every 6 (six) hours as needed for pain. 60 tablet 0     No current facility-administered medications on file prior to visit.      /52  Pulse 79  Resp 18  Ht 5' 4\" (1.626 m)  Wt 118 lb 14.4 oz (53.9 kg)  SpO2 96% Comment: RA  BMI 20.41 kg/m2    Medical History  Active Ambulatory (Non-Hospital) Problems    Diagnosis     Abscess of upper lobe of left lung without pneumonia     Chronic bilateral low back pain with left-sided sciatica     Urinary incontinence, unspecified type     Controlled substance agreement signed     Acute, but ill-defined, cerebrovascular disease     Abnormal Weight Loss     Localized Osteoarthritis Of The Wrist     Lactase Deficiency Syndrome     Peripheral Vascular Disease     Insomnia     Nicotine Dependence     Osteoarthritis Of Multiple Sites     Disturbance Of Gait     Anterior Wall Chest Pain With Respiration     Hypercholesteremia     Chronic Pain     Hypertension     Arteriosclerotic Cardiovascular Disease (ASCVD)     Osteoarthritis Of The Hip     Joint Pain, Localized In The Shoulder "     No past medical history on file.     Surgical History  He  has a past surgical history that includes arthroplasty wrist jt; remv stomach,part,distal,gastroduod; larynx surg proc unlisted; arthroplasty wrist jt; evans w/o facetec foramot/dskc 1/2 vrt seg, cervical; evans w/o facetec foramot/dskc 1/2 vrt seg, cervical; remv prostate,perineal,radical; fusion radioulnar jnt/ulna resec; and remove eye w implant.       Social History  Reviewed, and he  reports that he has been smoking Pipe.  He has never used smokeless tobacco.    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     Allergies  Allergies   Allergen Reactions     Cephalosporins      Colchicine      Penicillins     Family History  Reviewed, and no family history of lung cancer or COPD.                            Data Review - imaging, labs, and ekgs listed below were reviewed by me.  Chest XRay and chest CT images and EKG tracings interpreted personally.     Past Labs  Hospital Outpatient Visit on 05/09/2017   Component Date Value     Glucose, POC 05/09/2017 122    Office Visit on 05/02/2017   Component Date Value     Sodium 05/02/2017 145      Potassium 05/02/2017 3.2*     Chloride 05/02/2017 107      CO2 05/02/2017 25      Anion Gap, Calculation 05/02/2017 13      Glucose 05/02/2017 134*     BUN 05/02/2017 23      Creatinine 05/02/2017 1.29      GFR MDRD Af Amer 05/02/2017 >60      GFR MDRD Non Af Amer 05/02/2017 54*     Bilirubin, Total 05/02/2017 0.3      Calcium 05/02/2017 8.9      Protein, Total 05/02/2017 6.1      Albumin 05/02/2017 2.9*     Alkaline Phosphatase 05/02/2017 68      AST 05/02/2017 11      ALT 05/02/2017 10      WBC 05/02/2017 10.8      RBC 05/02/2017 3.45*     Hemoglobin 05/02/2017 10.5*     Hematocrit 05/02/2017 31.0*     MCV 05/02/2017 90      MCH 05/02/2017 30.3      MCHC 05/02/2017 33.8      RDW 05/02/2017 12.4      Platelets 05/02/2017 341      MPV 05/02/2017 6.8*     Neutrophils % 05/02/2017 84*     Lymphocytes  % 05/02/2017 6*     Monocytes % 05/02/2017 8      Eosinophils % 05/02/2017 1      Basophils % 05/02/2017 1      Neutrophils Absolute 05/02/2017 9.1*     Lymphocytes Absolute 05/02/2017 0.7*     Monocytes Absolute 05/02/2017 0.9      Eosinophils Absolute 05/02/2017 0.1      Basophils Absolute 05/02/2017 0.1      Albumin % 05/02/2017 56.3      Albumin  05/02/2017 3.4      Alpha 1 % 05/02/2017 4.8*     Alpha 1 05/02/2017 0.3      Alpha 2 % 05/02/2017 12.6      Alpha 2 05/02/2017 0.8      % Beta 05/02/2017 12.4      Beta 05/02/2017 0.8      Gamma Globulin % 05/02/2017 13.9      Gamma Globulin 05/02/2017 0.8      ELP Comment 05/02/2017 Unremarkable protein electrophoresis.      Protein, Total 05/02/2017 6.1      Path ICD: 05/02/2017 M89.8X9      Interpreted By: 05/02/2017 Chay Hall MD        Past Imaging  Xr Femur Left 2 Vws    Result Date: 4/25/2017  XR FEMUR LEFT 2 VWS 4/25/2017 10:49 AM INDICATION: Mid femur tenderness pain. COMPARISON: None. FINDINGS: No fracture. 1 cm lucency projecting over the medial femoral condyle could represent summation artifact or less likely lytic lesion. Dedicated knee films or MRI could assess further. Arterial calcification. NOTE: ABNORMAL REPORT THE DICTATION ABOVE DESCRIBES AN ABNORMALITY FOR WHICH FOLLOW-UP IS NEEDED. This report was electronically interpreted by: Dr. Otis Chou MD ON 04/25/2017 at 13:24    Xr Knee Left Plus Sunrise Vw    Result Date: 4/27/2017  XR KNEE LEFT PLUS SUNRISE VW 4/27/2017 2:17 PM INDICATION: lucency seen on near medial epicondyle on femur xrays. COMPARISON: Femur film 4/25/2017 FINDINGS: There is a somewhat mottled pattern in both the distal femur and proximal tibia and fibula. A permeative pattern of bone can be seen in entities such as infection multiple myeloma and metastasis. This report was electronically interpreted by: Dr. Drake Butler MD ON 04/27/2017 at 18:38    Nm Pet Ct Skull To Mid Thigh    Result Date: 5/9/2017  PET FDG/CT  5/9/2017 2:45 PM INDICATION: Left pulmonary nodule TECHNIQUE: Serum glucose level 122 mg/dL. One hour post left antecubital intravenous administration of 8.4 mCi F-18 FDG, PET imaging was performed from the skull base to the mid thighs utilizing attenuation correction with concurrent axial CT and PET/CT image fusion. Dose reduction techniques were used. COMPARISON: CT from 05/03/2017 is reviewed. FINDINGS: HEAD AND NECK: Left enucleation with globe prosthesis. Cavum septum pellucidum. Moderate generalized cerebral and cerebellar volume loss, likely age-related. CHEST: 3.8 x 3.5 cm opacity in the lingula is markedly FDG avid (SUVmax 7.8) peripherally and completely photopenic and of low attenuation centrally. Normal sized left interlobar station 11L, left hilar station 10L, subcarinal station 7, and upper right paratracheal station 2R lymph nodes are mildly FDG avid. The station 7 node, for example, has an SUV max of 3.6. Calcified bilateral pleural plaques. Tiny left pleural effusion. Calcified atherosclerosis, including dense coronary disease. Decreased attenuation of blood pool relative to myocardium, suggesting anemia. ABDOMEN/PELVIS: No abnormal FDG activity. Nonobstructing left kidney stone. Dense calcified atherosclerosis. Right testicle is in the right inguinal canal. Prostatectomy and pelvic lymphadenectomy. Marrufo catheter. MUSCULOSKELETAL: Mild pectus excavatum. L3-L4 decompressive laminectomies. Posterior fusion L3-L4. Bilateral L4-L5 facet arthrodesis. Moderate degenerative change throughout the spine.     CONCLUSION: 3.8 cm left upper lobe pulmonary opacity could represent a necrotic tumor or an abscess. Left hilar and mediastinal lymphadenopathy could be metastatic or reactive. Consider endobronchial ultrasound-guided mediastinal lymph node biopsy for tissue characterization and staging.    Ct Chest Abdomen Pelvis Without Oral With Iv Contrast    Result Date: 5/3/2017  CT CHEST, ABDOMEN, AND PELVIS  5/3/2017 1:15 PM      INDICATION: bone lucency seen in distal femur and tibia. Evaluate for malignancy. Long time smoker and possible asbestos exposure TECHNIQUE: CT chest, abdomen, and pelvis. Dose reduction techniques were used. IV CONTRAST: Iohexol (Omni) 75mL COMPARISON: CT chest 7/10/2014. Chest radiographs 10/15/2015. MR lumbar spine 6/8/2010. Radiographs of the left femur 4/25/2017 and left knee 4/27/2017. FINDINGS: CHEST: 4.0 cm rounded subpleural mass of the posterior lateral lingula is predominantly of fluid density (e.g. image 40 of series 2). The lesion has a fairly uniformly thickened wall. No enlarged hilar or mediastinal lymph nodes. Moderate centrilobular emphysema. Bilateral calcified pleural plaques consistent with prior asbestos exposure. Dependent and streaky atelectasis of the left lower lobe. Mild streaky atelectasis or scarring in the right middle lobe. Small scattered scars in both lungs. Small left pleural effusion. Normal heart size. Multivessel coronary artery calcifications. Trace pericardial fluid.  ABDOMEN: Motion artifact degrades the images. A few subcentimeter hypodensities of the liver are too small for characterization. Distended gallbladder. No radiopaque gallstones seen. Mild nodularity of both adrenal glands. Atrophic pancreas. Central calcifications of both kidneys appear to be vascular rather than nonobstructing stones. Dense calcified atherosclerotic plaque of the aorta and branch vessels. 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 No aortic aneurysm. 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 Both superficial femoral arteries also appear to be occluded although are not fully imaged. Included deep femoral arteries are patent. PELVIS: Marrufo catheter decompresses the urinary bladder. Surgical changes of prostatectomy. MUSCULOSKELETAL: Bones are demineralized. 1.6 cm lucent lesion of the L3 vertebral body with thin sclerotic rim is unchanged compared to MR of the  lumbar spine 6/18/2010 and consistent with a benign process. No concerning lytic or blastic bone lesions detected in the torso. Surgical changes of posterior decompression and instrument effusion at L3-4 and instrumented fusion. Fusion of the facet joints at L4-5. Changes of degenerative disc disease and spondylosis throughout the spine. Mild convex right lumbar scoliosis centered at L3. Severe degenerative osteoarthritis of the right femoral acetabular joint with bone-on-bone articulation.     CONCLUSION: 1.  4.0 cm thick-walled fluid density lesion of the lingula is new from the most recent available chest examination, radiographs of 10/15/2015. Chronic abscess/necrotic lung parenchyma secondary to prior pneumonia is favored over necrotic lung cancer which is also in the differential diagnosis. Appearance is not typical of mesothelioma. This could be aspirated under CT guidance; however, PET/CT is recommended prior to performing any procedure. 2.  No evidence of metastatic disease in the chest, abdomen or pelvis. 3.  Emphysema. 4.  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 5.  Small left pleural effusion. 6.  Advanced atherosclerotic disease. 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 7.  Other findings as discussed above.

## 2021-06-10 NOTE — PROGRESS NOTES
Assessment: /    Plan:    1. Chronic bilateral low back pain with left-sided sciatica     2. Urinary incontinence, unspecified type     3. Seborrheic keratoses         Continue current medications.  Recheck in 2 months.  Lumbar support to be supplied by the company he contacted.  This was a 35 minute visit with >50% of the time spent in counseling and coordination of care regarding: Low back pain, urinary incontinence, seborrheic keratoses.      Subjective:    HPI:  Danilo Javier is an 81-year-old male returning for follow-up on low back pain.  MRI of the lumbar spine in 2010 demonstrated severe bilateral foraminal narrowing at L5-S1 and L4-L5.  He underwent laminectomies with screw placement in 2013.  He has been using a chair that has 4 wheels.  It is not a walker, and it is not a wheelchair.  He is taking oxycodone due to the chronic pain.  He would like to use a lumbar support, and he is aware of a company that can supply one.    He also uses depends due to urinary incontinence.  He had prostatectomy in 2007.    He notes moles on the back that he wishes to have examined.    Social Hx: He is accompanied by his wife.    Review of Systems: He denies fever or cough.      Current Outpatient Prescriptions   Medication Sig Dispense Refill     amLODIPine (NORVASC) 5 MG tablet Take 1 tablet (5 mg total) by mouth daily. 90 tablet 0     aspirin 325 MG tablet Take 325 mg by mouth daily.       CALCIUM CARBONATE (CALCIUM 500 ORAL) Take 1 tablet by mouth 2 (two) times a day.       furosemide (LASIX) 20 MG tablet TAKE 2 TABLET BY MOUTH DAILY 180 tablet 1     lisinopril (PRINIVIL,ZESTRIL) 10 MG tablet TAKE 1 TABLET BY MOUTH EVERY DAY 90 tablet 0     lovastatin (MEVACOR) 20 MG tablet TAKE 1 TABLET BY MOUTH DAILY 90 tablet 3     multivitamin (MULTIVITAMIN) per tablet Take 1 tablet by mouth daily.       Oxycodone HCl 10 mg Tab Take 10 mg by mouth 3 (three) times a day as needed. 90 each 0     No current facility-administered  medications for this visit.          Objective:    Vitals:    03/06/17 1043   BP: 128/60   Pulse: 70   Resp: 24   Temp: 97.8  F (36.6  C)   SpO2: 92%       Gen:  NAD, VSS  Lungs:  normal  Heart:  normal  Back with a few benign-appearing seborrheic keratoses.  Left knee with tenderness of the infrapatellar tendon.        ADDITIONAL HISTORY SUMMARIZED (2): None.  DECISION TO OBTAIN EXTRA INFORMATION (1): None.   RADIOLOGY TESTS (1): None.  LABS (1): None.  MEDICINE TESTS (1): None.  INDEPENDENT REVIEW (2 each): None.     Total Data Points: 0

## 2021-06-10 NOTE — PROGRESS NOTES
Pt doesn't walk and is in a wheelchair long distances. 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

## 2021-06-10 NOTE — PROGRESS NOTES
[facility] Clinic Note  Patient Name: Danilo Javier  Patient Age: 81 y.o.  YOB: 1935  MRN: [id number]  ?  Date of Visit: 5/2/2017  Reason for Office Visit:   Chief Complaint   Patient presents with     Follow-up     Follow up x-rays        HPI: Danilo Javier 81 y.o. male who presents to clinic for follow up x-rays and left leg pain. He was seen last week with insidious onset left leg pain. xrays were done which showed a mottled luceny pattern in the dital femur and proximal tibia concerning for either multiple myeloma, malignancy or infection. He does not have any systemic signs such as fevers/chills to suggest infection nor any open wounds in that area. The pain comes and goes,can be quite intense at times. He is taking oxy 10 mg TID, but can be ineffective at times.     Review of Systems: As noted in HPI     Current Scheduled Meds:  Outpatient Encounter Prescriptions as of 5/2/2017   Medication Sig Dispense Refill     amLODIPine (NORVASC) 5 MG tablet Take 1 tablet (5 mg total) by mouth daily. 90 tablet 0     aspirin 325 MG tablet Take 325 mg by mouth daily.       CALCIUM CARBONATE (CALCIUM 500 ORAL) Take 1 tablet by mouth 2 (two) times a day.       furosemide (LASIX) 20 MG tablet TAKE 2 TABLET BY MOUTH DAILY 180 tablet 1     lisinopril (PRINIVIL,ZESTRIL) 10 MG tablet TAKE 1 TABLET BY MOUTH EVERY DAY 90 tablet 0     lovastatin (MEVACOR) 20 MG tablet TAKE 1 TABLET BY MOUTH DAILY 90 tablet 3     multivitamin (MULTIVITAMIN) per tablet Take 1 tablet by mouth daily.       Oxycodone HCl 10 mg Tab Take 10 mg by mouth 3 (three) times a day as needed. 90 each 0     [DISCONTINUED] diclofenac sodium (VOLTAREN) 1 % Gel Apply sparingly to affected area up to four times a day 100 g 3     traMADol (ULTRAM) 50 mg tablet Take 1 tablet (50 mg total) by mouth every 6 (six) hours as needed for pain. 60 tablet 0     No facility-administered encounter medications on file as of 5/2/2017.        Objective /  Physical Examination:  /70  Pulse 80  Wt 121 lb (54.9 kg)  Wt Readings from Last 3 Encounters:   05/02/17 121 lb (54.9 kg)   04/25/17 123 lb (55.8 kg)   03/06/17 125 lb 12 oz (57 kg)     There is no height or weight on file to calculate BMI. (>25?)    General Appearance: Alert and oriented in no acute distress. Frail older gentleman sitting in wheelchair   Eyes: Conjunctivae clear and sclerae non-icteric  Cardiovascular: RRR   Extremities: ttp at distal femur. No swelling, erythema, open wounds  Integumentary: Warm and dry. Without suspicious looking lesions  Neuro: Alert and oriented, follows commands appropriately    Assessment / Plan / Medical Decision Making:      Encounter Diagnoses   Name Primary?     Radiolucent lesion of bone Yes     Left leg pain         1. Radiolucent lesion of bone  - Comprehensive Metabolic Panel  - HM1(CBC and Differential)  - CT Chest Abdomen Pelvis Without Oral With IV Contrast; Future  - HM1 (CBC with Diff)  - traMADol (ULTRAM) 50 mg tablet; Take 1 tablet (50 mg total) by mouth every 6 (six) hours as needed for pain.  Dispense: 60 tablet; Refill: 0  - Electrophoresis, Protein, Serum, Cascade    2. Left leg pain    Unclear if this represents mets from malignancy, MM, or something else. Will check some basic labs today including CBC, renal function and liver, as well a serum electrophoresis. Given long history of smoking and asbestos exposure will order a CT CAP with contrast to look for potential malignancy.     For pain he can try ultram Q6h for breakthrough pain.     Follow up results and return in 1 week to be seen.     Total time spent with patient was 15 minutes with >50% of time spent in face-to-face counseling regarding the above plan     Darryl Zurita MD  [facility]

## 2021-06-10 NOTE — PROGRESS NOTES
[facility] Clinic Note  Patient Name: Danilo Javier  Patient Age: 81 y.o.  YOB: 1935  MRN: [id number]  ?  Date of Visit: 4/25/2017  Reason for Office Visit:   Chief Complaint   Patient presents with     Leg Pain     left leg and hip pain ongoing for 6 months. Oxycodone is not helping. pain has come out of nowhere. Sometimes wakes him up.        HPI: Danilo Javier 81 y.o. male who presents to clinic for left leg/hip pain. Pain mostly around mid femur, sharp pains, no radiation. 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 Pain is hips going on for about 6 mo. Pain in leg started a couple weeks ago. Trouble sleeping sometimes at night due to pain. Has been taking oxycodone 10 mg TID, but does not help sometimes. No trauma.     Review of Systems: As noted in HPI     Current Scheduled Meds:  Outpatient Encounter Prescriptions as of 4/25/2017   Medication Sig Dispense Refill     amLODIPine (NORVASC) 5 MG tablet Take 1 tablet (5 mg total) by mouth daily. 90 tablet 0     aspirin 325 MG tablet Take 325 mg by mouth daily.       CALCIUM CARBONATE (CALCIUM 500 ORAL) Take 1 tablet by mouth 2 (two) times a day.       furosemide (LASIX) 20 MG tablet TAKE 2 TABLET BY MOUTH DAILY 180 tablet 1     lisinopril (PRINIVIL,ZESTRIL) 10 MG tablet TAKE 1 TABLET BY MOUTH EVERY DAY 90 tablet 0     lovastatin (MEVACOR) 20 MG tablet TAKE 1 TABLET BY MOUTH DAILY 90 tablet 3     multivitamin (MULTIVITAMIN) per tablet Take 1 tablet by mouth daily.       Oxycodone HCl 10 mg Tab Take 10 mg by mouth 3 (three) times a day as needed. 90 each 0     diclofenac sodium (VOLTAREN) 1 % Gel Apply sparingly to affected area up to four times a day 100 g 3     [DISCONTINUED] doxycycline (ADOXA) 100 MG tablet TK 1 T PO BID  0     No facility-administered encounter medications on file as of 4/25/2017.        Objective / Physical Examination:  /64  Pulse 78  Wt 123 lb (55.8 kg)  Wt Readings from Last 3 Encounters:   04/25/17 123 lb (55.8 kg)   03/06/17  125 lb 12 oz (57 kg)   01/17/17 117 lb (53.1 kg)     There is no height or weight on file to calculate BMI. (>25?)    General Appearance: Alert and oriented in no acute distress  Extremities: strength with hip flexion equal symmetric while sitting in wheelchair. Mild tenderness to palpation over mid thigh. No swelling. Sensation intact  Integumentary: Warm and dry. Without suspicious looking lesions  Neuro: Alert and oriented, follows commands appropriately.    Assessment / Plan / Medical Decision Making:      Encounter Diagnoses   Name Primary?     Left leg pain Yes        1. Left leg pain    Xray taken today, the radiologist commented on a small lucency near the medial epicondyle, which could represent artifact or less likely a lytic lesion.   I deferred increasing his oxycodone and will try an diclofenac gel QID prn. Will order a dedicated knee film to evaluate this further and follow up the results.     - XR Femur Left 2 VWS; Future  - diclofenac sodium (VOLTAREN) 1 % Gel; Apply sparingly to affected area up to four times a day  Dispense: 100 g; Refill: 3    F/u with PCP in 1-2 weeks     Total time spent with patient was 15 minutes with >50% of time spent in face-to-face counseling regarding the above plan     Darryl Zurita MD  [facility]

## 2021-06-10 NOTE — PROGRESS NOTES
HPI: I am consulted in this 81 y.o. male regarding peripheral vascular disease. The patient has complaints of left knee pain which is been ongoing for the past several weeks.  He is currently wheelchair-bound and does not walk much.  He is not very communicative nor is his wife at the time of interview.  He continues to smoke approximately 1 pack a day and recently underwent left knee imaging which demonstrated possible metastatic disease versus lytic lesion from a multiple myeloma.  He has had ABIs done in 2015 which were noted.  He denies any groin pain or any other lower extremity pain other than his left knee pain.    Allergies   Allergen Reactions     Cephalosporins      Colchicine      Penicillins          Current Outpatient Prescriptions:      amLODIPine (NORVASC) 5 MG tablet, Take 1 tablet (5 mg total) by mouth daily., Disp: 90 tablet, Rfl: 0     aspirin 325 MG tablet, Take 325 mg by mouth daily., Disp: , Rfl:      CALCIUM CARBONATE (CALCIUM 500 ORAL), Take 1 tablet by mouth 2 (two) times a day., Disp: , Rfl:      clindamycin (CLEOCIN HCL) 300 MG capsule, Take 1 capsule (300 mg total) by mouth 3 (three) times a day., Disp: 126 capsule, Rfl: 0     furosemide (LASIX) 20 MG tablet, TAKE 2 TABLET BY MOUTH DAILY, Disp: 180 tablet, Rfl: 1     lisinopril (PRINIVIL,ZESTRIL) 10 MG tablet, TAKE 1 TABLET BY MOUTH EVERY DAY, Disp: 90 tablet, Rfl: 0     lovastatin (MEVACOR) 20 MG tablet, TAKE 1 TABLET BY MOUTH DAILY, Disp: 90 tablet, Rfl: 3     multivitamin (MULTIVITAMIN) per tablet, Take 1 tablet by mouth daily., Disp: , Rfl:      oxyCODONE (ROXICODONE) 10 mg immediate release tablet, Take 1 tablet (10 mg total) by mouth 3 (three) times a day as needed., Disp: 90 tablet, Rfl: 0     traMADol (ULTRAM) 50 mg tablet, Take 1 tablet (50 mg total) by mouth every 6 (six) hours as needed for pain., Disp: 60 tablet, Rfl: 0    History reviewed. No pertinent past medical history.    Past Surgical History:   Procedure Laterality  Date     IN ARTHROPLASTY WRIST JT      Description: Wrist Arthroplasty;  Recorded: 04/07/2008;     IN ARTHROPLASTY WRIST JT      Description: Wrist Arthroplasty;  Proc Date: 04/30/2007;     IN FUSION RADIOULNAR JNT/ULNA RESEC      Description: Distal Radioulnar Arthrodesis And Segmental Ulnar Resection;  Proc Date: 11/15/2006;     IN ADAN W/O FACETEC FORAMOT/DSKC 1/2 VRT SEG, CERVICAL      Description: Laminectomy Lumbar;  Proc Date: 01/01/2000;     IN ADAN W/O FACETEC FORAMOT/DSKC 1/2 VRT SEG, CERVICAL      Description: Laminectomy Lumbar;  Proc Date: 01/01/1999;     IN LARYNX SURG PROC UNLISTED      Description: Laryngeal Surgery;  Recorded: 02/28/2012;     IN REMOVE EYE W IMPLANT      Description: Globe Enucleation With Implant;  Proc Date: 01/01/1946;  Comments: 1946, for trauma     IN REMV PROSTATE,PERINEAL,RADICAL      Description: Prostatectomy, Perineal Radical;  Proc Date: 01/15/2001;     IN REMV STOMACH,PART,DISTAL,GASTRODUOD      Description: Partial Gastrectomy;  Recorded: 02/28/2012;  Comments: for perforated gastric ulcer       Social History     Social History     Marital status:      Spouse name: N/A     Number of children: N/A     Years of education: N/A     Occupational History     Not on file.     Social History Main Topics     Smoking status: Current Every Day Smoker     Packs/day: 0.50     Years: 65.00     Types: Pipe     Smokeless tobacco: Never Used     Alcohol use No     Drug use: Not on file     Sexual activity: Not on file     Other Topics Concern     Not on file     Social History Narrative         Allergies:Cephalosporins; Colchicine; and Penicillins    History reviewed. No pertinent past medical history.    Past Surgical History:   Procedure Laterality Date     IN ARTHROPLASTY WRIST JT      Description: Wrist Arthroplasty;  Recorded: 04/07/2008;     IN ARTHROPLASTY WRIST JT      Description: Wrist Arthroplasty;  Proc Date: 04/30/2007;     IN FUSION RADIOULNAR JNT/ULNA RESEC       Description: Distal Radioulnar Arthrodesis And Segmental Ulnar Resection;  Proc Date: 11/15/2006;     [place] ADAN W/O FACETEC FORAMOT/DSKC 1/2 VRT SEG, CERVICAL      Description: Laminectomy Lumbar;  Proc Date: 01/01/2000;     [place] ADAN W/O FACETEC FORAMOT/DSKC 1/2 VRT SEG, CERVICAL      Description: Laminectomy Lumbar;  Proc Date: 01/01/1999;     [place] LARYNX SURG PROC UNLISTED      Description: Laryngeal Surgery;  Recorded: 02/28/2012;     [place] REMOVE EYE W IMPLANT      Description: Globe Enucleation With Implant;  Proc Date: 01/01/1946;  Comments: 1946, for trauma     [place] REMV PROSTATE,PERINEAL,RADICAL      Description: Prostatectomy, Perineal Radical;  Proc Date: 01/15/2001;     [place] REMV STOMACH,PART,DISTAL,GASTRODUOD      Description: Partial Gastrectomy;  Recorded: 02/28/2012;  Comments: for perforated gastric ulcer       CURRENT MEDS:  Current Outpatient Prescriptions   Medication Sig Dispense Refill     amLODIPine (NORVASC) 5 MG tablet Take 1 tablet (5 mg total) by mouth daily. 90 tablet 0     aspirin 325 MG tablet Take 325 mg by mouth daily.       CALCIUM CARBONATE (CALCIUM 500 ORAL) Take 1 tablet by mouth 2 (two) times a day.       clindamycin (CLEOCIN HCL) 300 MG capsule Take 1 capsule (300 mg total) by mouth 3 (three) times a day. 126 capsule 0     furosemide (LASIX) 20 MG tablet TAKE 2 TABLET BY MOUTH DAILY 180 tablet 1     lisinopril (PRINIVIL,ZESTRIL) 10 MG tablet TAKE 1 TABLET BY MOUTH EVERY DAY 90 tablet 0     lovastatin (MEVACOR) 20 MG tablet TAKE 1 TABLET BY MOUTH DAILY 90 tablet 3     multivitamin (MULTIVITAMIN) per tablet Take 1 tablet by mouth daily.       oxyCODONE (ROXICODONE) 10 mg immediate release tablet Take 1 tablet (10 mg total) by mouth 3 (three) times a day as needed. 90 tablet 0     traMADol (ULTRAM) 50 mg tablet Take 1 tablet (50 mg total) by mouth every 6 (six) hours as needed for pain. 60 tablet 0     No current facility-administered medications for this visit.        Family History   Problem  "Relation Age of Onset     Breast cancer Mother      Heart disease Brother      Breast cancer Sister         reports that he has been smoking Pipe.  He has a 32.50 pack-year smoking history. He has never used smokeless tobacco. He reports that he does not drink alcohol.                      Review of Systems -   The 12 system review is within normal limits except for as mentioned above.  General ROS: No complaints or constitutional symptoms  Ophthalmic ROS: No complaints of visual changes  Skin: As per HPI  Endocrine: No complaints or symptoms  Hematologic/Lymphatic: No symptoms or complaints  Psychiatric: No symptoms or complaints  Respiratory ROS: no cough, shortness of breath, or wheezing  Cardiovascular ROS: no chest pain or dyspnea on exertion  Gastrointestinal ROS: no GI symptoms or complaints  Genito-Urinary ROS: no dysuria, trouble voiding, or hematuria  Musculoskeletal ROS: As per HPI  Neurological ROS: no TIA or stroke symptoms      /62 (Patient Site: Left Arm, Patient Position: Sitting)  Pulse 72  Temp 97.8  F (36.6  C) (Oral)   Resp 18  Ht 5' 4\" (1.626 m) Comment: per pt report  Wt 123 lb (55.8 kg) Comment: per pt report  BMI 21.11 kg/m2  Body mass index is 21.11 kg/(m^2).    EXAM:  GENERAL: This is a 81 y.o. male sitting in a wheelchair, cachectic  SKIN: Grossly intact  HEAD AND NECK: Cranial nerves intact.   CARDIAC: RRR w/out murmur  CHEST/LUNG: Clear  ABDOMEN: Soft, non-tender, B/S present, no pulsatile mass  GROINS: Both femoral pulse palpable.  EXTREM: Bilateral lower extremity 2+ pitting edema no palpable pulses noted, feet are warm, left knee pain with palpation on the medial and lateral epicondyles  PSYCHIATRIC: Affect pleasant      IMAGES:   Xr Femur Left 2 Vws    Result Date: 4/25/2017  XR FEMUR LEFT 2 VWS 4/25/2017 10:49 AM INDICATION: Mid femur tenderness pain. COMPARISON: None. FINDINGS: No fracture. 1 cm lucency projecting over the medial femoral condyle could represent " summation artifact or less likely lytic lesion. Dedicated knee films or MRI could assess further. Arterial calcification. NOTE: ABNORMAL REPORT THE DICTATION ABOVE DESCRIBES AN ABNORMALITY FOR WHICH FOLLOW-UP IS NEEDED. This report was electronically interpreted by: Dr. Otis Chou MD ON 04/25/2017 at 13:24    Xr Knee Left Plus Sunrise Vw    Result Date: 4/27/2017  XR KNEE LEFT PLUS SUNRISE VW 4/27/2017 2:17 PM INDICATION: lucency seen on near medial epicondyle on femur xrays. COMPARISON: Femur film 4/25/2017 FINDINGS: There is a somewhat mottled pattern in both the distal femur and proximal tibia and fibula. A permeative pattern of bone can be seen in entities such as infection multiple myeloma and metastasis. This report was electronically interpreted by: Dr. Drake Butler MD ON 04/27/2017 at 18:38    Nm Pet Ct Skull To Mid Thigh    Result Date: 5/9/2017  PET FDG/CT 5/9/2017 2:45 PM INDICATION: Left pulmonary nodule TECHNIQUE: Serum glucose level 122 mg/dL. One hour post left antecubital intravenous administration of 8.4 mCi F-18 FDG, PET imaging was performed from the skull base to the mid thighs utilizing attenuation correction with concurrent axial CT and PET/CT image fusion. Dose reduction techniques were used. COMPARISON: CT from 05/03/2017 is reviewed. FINDINGS: HEAD AND NECK: Left enucleation with globe prosthesis. Cavum septum pellucidum. Moderate generalized cerebral and cerebellar volume loss, likely age-related. CHEST: 3.8 x 3.5 cm opacity in the lingula is markedly FDG avid (SUVmax 7.8) peripherally and completely photopenic and of low attenuation centrally. Normal sized left interlobar station 11L, left hilar station 10L, subcarinal station 7, and upper right paratracheal station 2R lymph nodes are mildly FDG avid. The station 7 node, for example, has an SUV max of 3.6. Calcified bilateral pleural plaques. Tiny left pleural effusion. Calcified atherosclerosis, including dense coronary disease.  Decreased attenuation of blood pool relative to myocardium, suggesting anemia. ABDOMEN/PELVIS: No abnormal FDG activity. Nonobstructing left kidney stone. Dense calcified atherosclerosis. Right testicle is in the right inguinal canal. Prostatectomy and pelvic lymphadenectomy. Marrufo catheter. MUSCULOSKELETAL: Mild pectus excavatum. L3-L4 decompressive laminectomies. Posterior fusion L3-L4. Bilateral L4-L5 facet arthrodesis. Moderate degenerative change throughout the spine.     CONCLUSION: 3.8 cm left upper lobe pulmonary opacity could represent a necrotic tumor or an abscess. Left hilar and mediastinal lymphadenopathy could be metastatic or reactive. Consider endobronchial ultrasound-guided mediastinal lymph node biopsy for tissue characterization and staging.    Ct Chest Abdomen Pelvis Without Oral With Iv Contrast    Result Date: 5/3/2017  CT CHEST, ABDOMEN, AND PELVIS 5/3/2017 1:15 PM      INDICATION: bone lucency seen in distal femur and tibia. Evaluate for malignancy. Long time smoker and possible asbestos exposure TECHNIQUE: CT chest, abdomen, and pelvis. Dose reduction techniques were used. IV CONTRAST: Iohexol (Omni) 75mL COMPARISON: CT chest 7/10/2014. Chest radiographs 10/15/2015. MR lumbar spine 6/8/2010. Radiographs of the left femur 4/25/2017 and left knee 4/27/2017. FINDINGS: CHEST: 4.0 cm rounded subpleural mass of the posterior lateral lingula is predominantly of fluid density (e.g. image 40 of series 2). The lesion has a fairly uniformly thickened wall. No enlarged hilar or mediastinal lymph nodes. Moderate centrilobular emphysema. Bilateral calcified pleural plaques consistent with prior asbestos exposure. Dependent and streaky atelectasis of the left lower lobe. Mild streaky atelectasis or scarring in the right middle lobe. Small scattered scars in both lungs. Small left pleural effusion. Normal heart size. Multivessel coronary artery calcifications. Trace pericardial fluid.  ABDOMEN: Motion  artifact degrades the images. A few subcentimeter hypodensities of the liver are too small for characterization. Distended gallbladder. No radiopaque gallstones seen. Mild nodularity of both adrenal glands. Atrophic pancreas. Central calcifications of both kidneys appear to be vascular rather than nonobstructing stones. Dense calcified atherosclerotic plaque of the aorta and branch vessels. 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 No aortic aneurysm. 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 Both superficial femoral arteries also appear to be occluded although are not fully imaged. Included deep femoral arteries are patent. PELVIS: Marrufo catheter decompresses the urinary bladder. Surgical changes of prostatectomy. MUSCULOSKELETAL: Bones are demineralized. 1.6 cm lucent lesion of the L3 vertebral body with thin sclerotic rim is unchanged compared to MR of the lumbar spine 6/18/2010 and consistent with a benign process. No concerning lytic or blastic bone lesions detected in the torso. Surgical changes of posterior decompression and instrument effusion at L3-4 and instrumented fusion. Fusion of the facet joints at L4-5. Changes of degenerative disc disease and spondylosis throughout the spine. Mild convex right lumbar scoliosis centered at L3. Severe degenerative osteoarthritis of the right femoral acetabular joint with bone-on-bone articulation.     CONCLUSION: 1.  4.0 cm thick-walled fluid density lesion of the lingula is new from the most recent available chest examination, radiographs of 10/15/2015. Chronic abscess/necrotic lung parenchyma secondary to prior pneumonia is favored over necrotic lung cancer which is also in the differential diagnosis. Appearance is not typical of mesothelioma. This could be aspirated under CT guidance; however, PET/CT is recommended prior to performing any procedure. 2.  No evidence of metastatic disease in the chest, abdomen or pelvis. 3.  Emphysema. 4.  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 5.  Small left pleural effusion. 6.  Advanced atherosclerotic disease. 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 7.  Other findings as discussed above.      Assessment/Plan: Danilo Javier has left knee pain which is of unclear etiology.  This may be likely to metastatic disease if in fact the findings on the CT scan of the chest and the PET scan are consistent with malignancy.  At this point I do not think further workup is warranted for his vasculature.  He is not a great candidate for intervention at this time especially given the fact that he is wheelchair-bound and not very mobile.  I have recommended that he stop smoking which she states is not possible.  I do think that he requires further workup for his chest mass/abscess.  I have recommended this to his wife who did not seem very interested at the time of exam.  At this point he may follow up with me on a as needed basis.  We will give him to go gauze for his edema which may facilitate comfort while sitting in his wheelchair.  With regards to his aneurysms we may continue follow-up with surveillance ultrasound in 6 months to ensure that these are not enlarging.      [place] ,DO FACS  [facility]

## 2021-06-12 NOTE — PROGRESS NOTES
Patient arrived to post procedure for gastrostomy (GJ) tube removal. Patient is here with his wife Liana. Order is checked and per order the tube was place mid June, per wife the end of May the tube was inserted. Explained to the patient regarding gj tube removal. Patient and wife voices no needs or concerns. Prep patient as protocal. Removed 5 ml to deflated the balloon. Patient tolerated removal. Applied gauze to site and Microfoam tape. Patient wife is given paper tape and gauze. Discharge paper work given. Patient stayed in the unit for 20 minutes. Denies any pain or needs.

## 2021-06-12 NOTE — PROGRESS NOTES
Assessment: /    Plan:    1. Pulmonary embolism, bilateral  INR   2. Essential hypertension with goal blood pressure less than 140/90  lisinopril (PRINIVIL,ZESTRIL) 40 MG tablet   3. Gastrostomy tube in place  IR Tube Removal       No warfarin today.  He will take 2.5 mg on Saturday, and none on Sunday.  Recheck INR Monday, 7/24/17.    I reconciled all of his medications.      Subjective:    HPI:  Danilo Javier is an 81-year-old male presenting for hospital follow-up.  He was at [facility] due to pulmonary embolism, 7/13/17 - 7/16/17.  He had multiple small pulmonary emboli.  Eliquis was not covered by insurance, therefore he was started on Lovenox and warfarin.  He has been taking 5 mg daily, except none for the past 2 days following INR of 4.6.  CT of the chest demonstrated no large or central emboli.  His pulmonary abscess has diminished significantly.  Echocardiogram on 7/15/17 demonstrated ejection fraction of 50-55%, which is lower limits of normal.  He has diastolic dysfunction.  Renal function panel demonstrated albumin low at 2.8, otherwise normal.  I reviewed the discharge summary by Dr. Richards.    He has a gastrostomy tube was placed during hospitalization in May for unresponsiveness.  He has not used this for feeding for the past 6 weeks, and wishes to have this removed.    Social Hx: He is accompanied by his wife, Liana.    Review of Systems: No chest pain or wheezing.  No epistaxis, melena or hematochezia.      Current Outpatient Prescriptions   Medication Sig Dispense Refill     amLODIPine (NORVASC) 5 MG tablet Take 5 mg by mouth daily.       aspirin 325 MG EC tablet Take 325 mg by mouth daily.       CALCIUM CARBONATE (CALCIUM 500 ORAL) Take 1 tablet by mouth 2 (two) times a day.       enoxaparin (LOVENOX) 60 mg/0.6 mL syringe Inject 0.5 mL (50 mg total) under the skin every 12 (twelve) hours. 10 Syringe 0     furosemide (LASIX) 20 MG tablet Take 2 tablets (40 mg total) by mouth daily. 60 tablet 0      Lactobacillus rhamnosus GG (CULTURELLE) 15 billion cell CpSP Take 1 capsule by mouth 3 (three) times a day with meals.  0     lisinopril (PRINIVIL,ZESTRIL) 40 MG tablet Take 1 tablet (40 mg total) by mouth daily. 30 tablet 11     lovastatin (MEVACOR) 20 MG tablet Take 1 tablet (20 mg total) by mouth daily. 30 tablet 0     metoprolol tartrate (LOPRESSOR) 25 MG tablet Take 0.5 tablets (12.5 mg total) by mouth 2 (two) times a day. 30 tablet 11     multivitamin (MULTIVITAMIN) per tablet Take 1 tablet by mouth daily.       POTASSIUM ORAL Take 1 tablet by mouth daily. OTC potassium       senna-docusate (PERICOLACE) 8.6-50 mg tablet Take 1 tablet by mouth 2 (two) times a day. Do not administer if loose stools.  0     traMADol (ULTRAM) 50 mg tablet Take 1 tablet (50 mg total) by mouth every 6 (six) hours as needed for pain. 30 tablet 0     warfarin (COUMADIN) 5 MG tablet Take one tab (5mg) tonight and check INR tomorrow and further dosing per primary. 30 tablet 0     oxyCODONE (ROXICODONE) 10 mg immediate release tablet Take 0.5 tablets (5 mg total) by mouth 3 (three) times a day as needed for pain. 45 tablet 0     No current facility-administered medications for this visit.          Objective:    Vitals:    07/21/17 1601   BP: 118/60   Pulse: (!) 56   Resp: 19   Temp: 98.2  F (36.8  C)   SpO2: 94%       Gen:  NAD, VSS  Lungs:  normal  Heart:  normal  Abdomen:  No HSM, mass or tenderness    INR is 3.8    ADDITIONAL HISTORY SUMMARIZED (2): Reviewed discharge summary.  DECISION TO OBTAIN EXTRA INFORMATION (1): None.   RADIOLOGY TESTS (1): Reviewed CT of the chest.  LABS (1): Reviewed INR and other labs.  MEDICINE TESTS (1): Reviewed echocardiogram.  INDEPENDENT REVIEW (2 each): None.     Total Data Points: 5

## 2021-06-13 NOTE — ANESTHESIA POSTPROCEDURE EVALUATION
Patient: Danilo Javier  LEFT HIP HEMIARTHROPLASTY  Anesthesia type: spinal    Patient location: PACU  Last vitals:   Vitals:    10/20/17 1145   BP: 116/58   Pulse: (!) 54   Resp: 14   Temp: 36.2  C (97.1  F)   SpO2: 100%   Case done under SAB  Post vital signs: stable  Level of consciousness: appears baseline, fully awake  Post-anesthesia pain: pain controlled  Post-anesthesia nausea and vomiting: no  Pulmonary: room air  Cardiovascular: stable and blood pressure at baseline  Hydration: adequate  Anesthetic events: no    QCDR Measures:  ASA# 11 - Zoya-op Cardiac Arrest: ASA11B - Patient did NOT experience unanticipated cardiac arrest  ASA# 12 - Zoya-op Mortality Rate: ASA12B - Patient did NOT die  ASA# 13 - PACU Re-Intubation Rate: ASA13B - Patient did NOT require a new airway mgmt  ASA# 10 - Composite Anes Safety: ASA10A - No serious adverse event    Additional Notes:

## 2021-06-13 NOTE — ANESTHESIA CARE TRANSFER NOTE
Last vitals:   Vitals:    10/20/17 1145   BP: 116/58   Pulse: (!) 54   Resp: 14   Temp: 36.2  C (97.1  F)   SpO2: 100%     Patient's level of consciousness is drowsy  Spontaneous respirations: yes  Maintains airway independently: yes  Dentition unchanged: yes  Oropharynx: oropharynx clear of all foreign objects    QCDR Measures:  ASA# 20 - Surgical Safety Checklist: WHO surgical safety checklist completed prior to induction  PQRS# 430 - Adult PONV Prevention: 4558F - Pt received => 2 anti-emetic agents (different classes) preop & intraop  ASA# 8 - Peds PONV Prevention: NA - Not pediatric patient, not GA or 2 or more risk factors NOT present  PQRS# 424 - Zoya-op Temp Management: 4559F - At least one body temp DOCUMENTED => 35.5C or 95.9F within required timeframe  PQRS# 426 - PACU Transfer Protocol: - Transfer of care checklist used  ASA# 14 - Acute Post-op Pain: ASA14B - Patient did NOT experience pain >= 7 out of 10

## 2021-06-13 NOTE — ANESTHESIA PROCEDURE NOTES
Spinal Block    Start time: 10/20/2017 10:00 AM  End time: 10/20/2017 10:15 AM  Reason for block: primary anesthetic    Staffing:  Performing  Anesthesiologist: FIORDALIZA SHEETS    Preanesthetic Checklist  Completed: patient identified, risks, benefits, and alternatives discussed, timeout performed, consent obtained, airway assessed, oxygen available, suction available, emergency drugs available and hand hygiene performed  Spinal Block  Patient position: right lateral decubitus  Prep: Betadine and site prepped and draped  Patient monitoring: heart rate, cardiac monitor and blood pressure  Approach: midline  Location: L3-4  Injection technique: single-shot  Needle type: Quincke   Needle gauge: 22 G    Assessment  Sensory level: T8

## 2021-06-13 NOTE — ANESTHESIA PREPROCEDURE EVALUATION
"Anesthesia Evaluation      Patient summary reviewed   No history of anesthetic complications     Airway   Mallampati: II  Neck ROM: full   Pulmonary - normal exam   (+) a smoker  (-) pneumonia, shortness of breath                         Cardiovascular - normal exam  (+) hypertension, past MI, CAD, dysrhythmias, , hypercholesterolemia, PVD     Neuro/Psych    (+) CVA , chronic pain    Endo/Other    (+) arthritis,      GI/Hepatic/Renal    (+)   chronic renal disease,   (-) GERD     Other findings: Pt very inactive.  Not sure if he is a reliable historian.  States he smokes, denies GERD, denies residual from CVA, is in a wheelchair as he has \"trouble getting around\".  MI in 2002. Bilateral PEs 7/2017, placed on coumadin.  H/o lung abscess in past.  Dysphagia, severe malnutrition, weakness, chronic LBP, past h/o delirium, past h/o a-fib.  Mild AMBER, likely owing to low volume status.  Severe hypokalemia on admission, 2.4, being replaced. Suspect he came in dehydrated, labs today show Hg 10.1, K 4.5, Cr 0.92.      Echo 7/15/17:  Bradycardia and irregular heartbeat  Left ventricular systolic function appears lower limits of normal estimated at 50-55%  Left ventricular diastolic dysfunction consistent with impaired relaxation  Right ventricular systolic function appears lower limits of normal with mildly reduced TAPSE  Normal central venous pressure suggested  Aortic sclerosis without stenosis  Mild enlargement of the proximal aortic root  When compared to the previous study dated 5/22/2017, heart rate is slower. The degree of tricuspid insufficiency is less on the current examination Edentulous up, lower partial which is out, some teeth are decayed, denies any loose ones.      Dental                         Anesthesia Plan  Planned anesthetic: spinal    ASA 3     Anesthetic plan and risks discussed with: patient    Post-op plan: routine recovery          "

## 2021-06-15 PROBLEM — R41.0 DELIRIUM: Status: ACTIVE | Noted: 2017-05-25

## 2021-06-15 PROBLEM — R13.10 DYSPHAGIA: Status: ACTIVE | Noted: 2017-05-31

## 2021-06-15 PROBLEM — R32 URINARY INCONTINENCE, UNSPECIFIED TYPE: Status: ACTIVE | Noted: 2017-04-15

## 2021-06-15 PROBLEM — G89.29 CHRONIC BILATERAL LOW BACK PAIN WITH LEFT-SIDED SCIATICA: Chronic | Status: ACTIVE | Noted: 2017-04-15

## 2021-06-15 PROBLEM — Z51.5 PALLIATIVE CARE ENCOUNTER: Status: ACTIVE | Noted: 2017-05-25

## 2021-06-15 PROBLEM — R06.02 SHORTNESS OF BREATH: Status: ACTIVE | Noted: 2017-05-25

## 2021-06-15 PROBLEM — M54.42 CHRONIC BILATERAL LOW BACK PAIN WITH LEFT-SIDED SCIATICA: Chronic | Status: ACTIVE | Noted: 2017-04-15

## 2021-06-15 PROBLEM — J85.1: Status: ACTIVE | Noted: 2017-05-21

## 2021-06-16 PROBLEM — E43 SEVERE MALNUTRITION (H): Status: ACTIVE | Noted: 2017-06-01

## 2021-06-16 PROBLEM — F11.20 OPIOID DEPENDENCE, CONTINUOUS (H): Status: ACTIVE | Noted: 2017-10-25

## 2021-06-16 PROBLEM — G89.18 POSTOPERATIVE PAIN: Status: ACTIVE | Noted: 2017-10-25

## 2021-06-16 PROBLEM — R41.89 UNRESPONSIVE: Status: ACTIVE | Noted: 2017-06-05

## 2021-06-16 PROBLEM — S72.009A FEMORAL NECK FRACTURE (H): Status: ACTIVE | Noted: 2017-10-19

## 2021-06-16 PROBLEM — R53.1 WEAKNESS GENERALIZED: Status: ACTIVE | Noted: 2017-07-14

## 2021-06-16 PROBLEM — G89.4 CHRONIC PAIN SYNDROME: Status: ACTIVE | Noted: 2017-10-25

## 2021-06-16 PROBLEM — D64.9 ANEMIA: Status: ACTIVE | Noted: 2017-06-01

## 2021-06-16 PROBLEM — I26.99 PULMONARY EMBOLISM (H): Status: ACTIVE | Noted: 2017-07-13

## 2021-06-16 PROBLEM — R60.1 ANASARCA: Status: ACTIVE | Noted: 2017-06-01

## 2021-06-16 PROBLEM — Z86.73 H/O: STROKE: Status: ACTIVE | Noted: 2017-07-14

## 2021-06-16 PROBLEM — J85.2: Status: ACTIVE | Noted: 2017-06-07

## 2021-06-18 ENCOUNTER — RECORDS - HEALTHEAST (OUTPATIENT)
Dept: ADMINISTRATIVE | Facility: CLINIC | Age: 86
End: 2021-06-18

## 2021-06-25 NOTE — PROGRESS NOTES
Progress Notes by Sunil Vizcarra at 5/16/2017 11:20 AM     Author: Sunil Vizcarra Service: -- Author Type: Nurse Practitioner    Filed: 5/16/2017  1:30 PM Encounter Date: 5/16/2017 Status: Signed    : Sunil Vizcarra [facility]    Date of Service: 5/16/2017  Primary Provider: Brady Calvin MD    Patient Care Team:  Brady Calvin MD as PCP - General (Family Medicine)     ______________________________________________________________________     Patient's Pharmacy:    [facility] [id number] - [place], [place] - [address] AT [facility] [address]  [facility] [address]  [place] [address]  Phone: [phone] Fax: [phone]     Patient's Insurance:    Payor: MEDICA / Plan: MEDICA [employer] / Product Type: COST PLAN /     ______________________________________________________________________    Assessment:    1. Coccyx pain       ______________________________________________________________________      PHQ-2 Total Score: 0 (8/15/2016  9:00 AM)     Plan:  Patient Instructions   1. Tramadol  2. Ibuprofen as needed for inflammation  3. May try ice/heat therapy  4. Obtain donut cushion for pressure relief.    ______________________________________________________________________     Danilo Javier is 81 y.o. male who comes in today for:    Chief Complaint   Patient presents with   ? Fall     Fell on Tailbone 5/12 at home. Painful while sitting. Has to lay on the side while laying down.        Patient Active Problem List   Diagnosis   ? Lactase Deficiency Syndrome   ? Peripheral Vascular Disease   ? Insomnia   ? Nicotine Dependence   ? Osteoarthritis Of Multiple Sites   ? Disturbance Of Gait   ? Anterior Wall Chest Pain With Respiration   ? Hypercholesteremia   ? Chronic Pain   ? Hypertension   ? Arteriosclerotic Cardiovascular Disease (ASCVD)   ? Osteoarthritis Of The Hip   ? Joint Pain, Localized In The  Shoulder   ? Localized Osteoarthritis Of The Wrist   ? Abnormal Weight Loss   ? Acute, but ill-defined, cerebrovascular disease   ? Controlled substance agreement signed   ? Chronic bilateral low back pain with left-sided sciatica   ? Urinary incontinence, unspecified type   ? Abscess of upper lobe of left lung without pneumonia     Current Outpatient Prescriptions   Medication Sig   ? amLODIPine (NORVASC) 5 MG tablet Take 1 tablet (5 mg total) by mouth daily.   ? aspirin 325 MG tablet Take 325 mg by mouth daily.   ? CALCIUM CARBONATE (CALCIUM 500 ORAL) Take 1 tablet by mouth 2 (two) times a day.   ? clindamycin (CLEOCIN HCL) 300 MG capsule Take 1 capsule (300 mg total) by mouth 3 (three) times a day.   ? furosemide (LASIX) 20 MG tablet TAKE 2 TABLET BY MOUTH DAILY   ? lisinopril (PRINIVIL,ZESTRIL) 10 MG tablet TAKE 1 TABLET BY MOUTH EVERY DAY   ? lovastatin (MEVACOR) 20 MG tablet TAKE 1 TABLET BY MOUTH DAILY   ? multivitamin (MULTIVITAMIN) per tablet Take 1 tablet by mouth daily.   ? oxyCODONE (ROXICODONE) 10 mg immediate release tablet Take 1 tablet (10 mg total) by mouth 3 (three) times a day as needed.   ? traMADol (ULTRAM) 50 mg tablet Take 1 tablet (50 mg total) by mouth every 6 (six) hours as needed for pain.     Social History     Social History   ? Marital status:      Spouse name: N/A   ? Number of children: N/A   ? Years of education: N/A     Occupational History   ? Not on file.     Social History Main Topics   ? Smoking status: Current Every Day Smoker     Types: Pipe   ? Smokeless tobacco: Never Used   ? Alcohol use Not on file   ? Drug use: Not on file   ? Sexual activity: Not on file     Other Topics Concern   ? Not on file     Social History Narrative       ______________________________________________________________________     History of present illness: Danilo Javier is a pleasant 81 y.o. male who presents in clinic today, accompanied by his wife, for evaluation of coccyx pain.  He  "states upon that returning home from his pulmonology visit as he was attempting to climb the 9 steps up to his house he got weak/tired and fell backwards and landed on his tailbone x 2.  He subsequently crawled up the steps and pulled himself up by the railing to get into his house.  He has a history of chronic pain in his low back and hips and currently takes oxycodone for this.  He rates the pain in his tailbone as 10/10.  It is worse with sitting, and really does not get any comfort from any position.  He has not tried any ice packs or heat therapy at home.    Review of systems:   10 point review of systems is negative unless noted in the HPI.  ______________________________________________________________________    Wt Readings from Last 3 Encounters:   05/16/17 123 lb (55.8 kg)   05/12/17 118 lb 14.4 oz (53.9 kg)   05/09/17 122 lb (55.3 kg)     BP Readings from Last 3 Encounters:   05/16/17 146/72   05/12/17 108/52   05/02/17 128/70     /72  Pulse 86  Ht 5' 4\" (1.626 m)  Wt 123 lb (55.8 kg)  BMI 21.11 kg/m2     Physical Exam:  General Appearance: Alert, cooperative, no distress, appears stated age  Head: Normocephalic, without obvious abnormality, atraumatic  Eyes: PERRL, conjunctiva/corneas clear  Back: Symmetric, no curvature, no CVA tenderness, tenderness with firm palpation to sacral area  Lungs: Clear to auscultation bilaterally, respirations unlabored  Heart: Regular rate and rhythm, S1 and S2 normal, no murmur, rub, or gallop  Musculoskeletal: Weakness in lower extremities bilaterally  Extremities: Extremities normal, atraumatic, no cyanosis or edema  Neurologic: He is alert & oriented x 3.  Psychiatric: He has a normal mood and affect.       Sunil Vizcarra [facility]  Internal Medicine  [facility]     Return in about 2 weeks (around 5/30/2017), or if symptoms worsen or fail to improve.        "

## 2021-07-03 NOTE — ADDENDUM NOTE
Addendum Note by Brady Calvin MD at 6/18/2018  1:36 PM     Author: Brady Calvin MD Service: -- Author Type: Physician    Filed: 6/18/2018  1:36 PM Encounter Date: 6/18/2018 Status: Signed    : Brady Calvin MD (Physician)    Addended by: BRADY CALVIN on: 6/18/2018 01:36 PM        Modules accepted: Orders